# Patient Record
Sex: MALE | Race: BLACK OR AFRICAN AMERICAN | Employment: FULL TIME | URBAN - METROPOLITAN AREA
[De-identification: names, ages, dates, MRNs, and addresses within clinical notes are randomized per-mention and may not be internally consistent; named-entity substitution may affect disease eponyms.]

---

## 2018-02-05 ENCOUNTER — OFFICE VISIT (OUTPATIENT)
Dept: SURGERY | Age: 28
End: 2018-02-05

## 2018-02-05 VITALS
RESPIRATION RATE: 20 BRPM | DIASTOLIC BLOOD PRESSURE: 110 MMHG | WEIGHT: 315 LBS | TEMPERATURE: 98.8 F | HEART RATE: 83 BPM | SYSTOLIC BLOOD PRESSURE: 150 MMHG | OXYGEN SATURATION: 98 % | HEIGHT: 75 IN | BODY MASS INDEX: 39.17 KG/M2

## 2018-02-05 DIAGNOSIS — R29.818 SUSPECTED SLEEP APNEA: ICD-10-CM

## 2018-02-05 DIAGNOSIS — E66.01 MORBID OBESITY WITH BMI OF 45.0-49.9, ADULT (HCC): Primary | ICD-10-CM

## 2018-02-05 DIAGNOSIS — K21.9 GASTROESOPHAGEAL REFLUX DISEASE, ESOPHAGITIS PRESENCE NOT SPECIFIED: ICD-10-CM

## 2018-02-05 RX ORDER — GUANFACINE HYDROCHLORIDE 1 MG/1
TABLET ORAL DAILY
COMMUNITY

## 2018-02-05 RX ORDER — CLONAZEPAM 2 MG/1
TABLET ORAL 2 TIMES DAILY
COMMUNITY

## 2018-02-05 RX ORDER — RANITIDINE 300 MG/1
300 TABLET ORAL DAILY
COMMUNITY

## 2018-02-05 RX ORDER — MINOCYCLINE HYDROCHLORIDE 100 MG/1
100 TABLET ORAL 2 TIMES DAILY
COMMUNITY

## 2018-02-05 NOTE — PROGRESS NOTES
Bariatric Surgery Consult    Aleksandr Bergeron is a 32 y.o. male with a history of morbid obesity. His Height: 6' 3\" (190.5 cm), Weight: (!) 371 lb 6.4 oz (168.5 kg). Body mass index is 46.42 kg/(m^2). He reports that he has been trying to lose weight for \"a few\" years, with 12 lbs being the most he lost. His maximum weight was 380 pounds. He has attended our online bariatric surgery information seminar. Dimas De La Rosa wants to consider laparoscopic gastric bypass surgery. Pt is self-referred. Dietary History:   The patient says that in the past, unsupervised diets have not resulted in real success. When asked why he was not able to achieve or maintain significant weight loss he replied, \"I injured my back while exercising in the past, which prevents me from being physically active for prolonged periods. \"     Number of meals per day: 1-2 meals -- no breakfast, big lunch, and supper  Portion size: moderate to large  Snacks: 2-3x per day while at work (e.g. Chips, fruit, veggies)  Other dietary indiscretions:   Lenell Brian food -- rarely d/t causing nausea    Fast food -- 2-3x per week (e.g. Salad or grilled wrap)    Soda -- 1-1.5 L per day  (e.g. Ginger ale)   Sweets -- rarely    Carbohydrates -- \"Mostly bread. These are my downfall,\" per patient. Comorbidities:     Bariatric comorbidities present: GERD and weight related arthopathies    GERD is severe -- taking 300 mg Zantac OTC daily. EGD performed ~18 months ago in [place], [place], which was normal, per patient. Acid reflux has worsened since then. STOPBANG questionnaire    Recent sleep studies: 1.5 (home) and 2 years ago (in clinic). No diagnosis of HOSEA, but patient states that he \"always has difficulty falling asleep. \"        Do you Snore loudly? YES  Do you often feel Tired, fatigued, or sleepy during the daytime? YES  Has anyone Observed you stop breathing during your sleep? YES  Are you being treated for high blood Pressure? NO  BMI more than 35 kg/m2? YES  Age over 48years old? NO  Neck Circumference >16 inches? YES  Gender male? YES  ______________________________________    SCORE: 6/8    If YES to 0  2, low risk of sleep apnea  If YES to 3  4  intermediate risk of having sleep apnea  If YES to 5  8  high risk of having sleep apnea (or 2 + BMI 35 or Neck > 17\" or Male)     Ambulatory status: independent. Interrupted by back pain 2x/year. The patient's reported level of exercise: moderately active. Walking 1/2 mile per day at work. Past Medical History:   Diagnosis Date    GERD (gastroesophageal reflux disease)     Headache       Past Surgical History:   Procedure Laterality Date    HX ADENOIDECTOMY      as a child    HX OTHER SURGICAL  2010    cyst removed from back of head    HX TONSILLECTOMY      as a child. Current Outpatient Prescriptions   Medication Sig    clonazePAM (KLONOPIN) 2 mg tablet Take  by mouth two (2) times a day.  raNITIdine (ZANTAC) 300 mg tablet Take 300 mg by mouth daily.  guanFACINE IR (TENEX) 1 mg IR tablet Take  by mouth daily.  minocycline (DYNACIN) 100 mg tablet Take 100 mg by mouth two (2) times a day. No current facility-administered medications for this visit.        Not on File  Social History   Substance Use Topics    Smoking status: Former Smoker     Quit date: 2/5/2016    Smokeless tobacco: Current User    Alcohol use Yes      Family History   Problem Relation Age of Onset   [address] [facility] Cancer Mother     Depression Mother     Heart Attack Father             Review of Systems:  A comprehensive review of 12 systems was negative except for:   Gastrointestinal: acid indigestion or heartburn   Musculoskeletal: back trouble   Neurological: frequent headaches       Objective:     Visit Vitals    BP (!) 150/110    Pulse 83    Temp 98.8 °F (37.1 °C) (Oral)    Resp 20    Ht 6' 3\" (1.905 m)    Wt (!) 371 lb 6.4 oz (168.5 kg)    SpO2 98%    BMI 46.42 kg/m2        Physical Exam:    General:  alert, cooperative, NAD   Eyes:  conjunctivae and sclerae normal   Throat & Neck: no erythema or exudates noted and neck supple and symmetrical; no palpable masses  Mallamapatti class 4    Lungs:   clear to auscultation bilaterally   Heart:  Regular rate and rhythm   Abdomen:   abdomen is soft and obese without significant tenderness, masses, organomegaly or guarding, normal bowel sounds   Extremities: extremities normal, atraumatic, no edema   Skin: Normal.   Neuro: Mental status: Alert, oriented, thought content appropriate  Gait: Normal   Lymphatic: No supraclavicular adenopathy          Assessment:     Diagnoses and all orders for this visit:    1. Morbid obesity with BMI of 45.0-49.9, adult ([facility] [address]    2. Gastroesophageal reflux disease, esophagitis presence not specified    3. Suspected sleep apnea      Morbid obesity (Body mass index is 46.42 kg/(m^2). ) with multiple co-morbidities including GERD and weight related arthopathies. The patient meets criteria established by the NIH for weight loss surgery candidates. Without weight reduction, co-morbidities will escalate as well as increase risk of early mortality. Our recommendation is the patient could be served with laparoscopic gastric bypass surgery, due to severe GERD and reflux symptoms which are not well-controlled by daily 300mg Zantac. I explained to the patient differences between laparoscopic gastric bypass and laparoscopic vertical sleeve gastrectomy with respect to expected weight loss, resolution of comorbidities and risks. Mr. Tamika Damon has attended one our informational meetings and has seen our educational materials. He has requested Dr. Sharolyn Mortimer to perform his procedure. I reviewed the role for this procedure as a tool to help him achieve his weight loss goals. I reminded him that effective weight loss comes from lifelong adherence to changes in dietary choices, eating habits and exercise.     While patient has been tested for HOSEA in the past with no diagnosis, STOP BANG score of 6/8 and Mallamapatti class 4 indicates otherwise. Advised the patient to provide office with documentation of prior sleep studies during next visit. If results are ambiguous or inconclusive, we may refer the patient to sleep medicine for re-testing. Recommendation:     1. We recommend that the patient undergo the following evaluations prior to considering requesting approval for laparoscopic gastric bypass surgery:       Dietician: YES, referral provided. Gastroenterology: YES, referral provided for patient to undergo UGI. Psychiatry/Psychology: YES, referral NOT provided to patient. Sleep Medicine: No, STOP BANG score 6/8.     2. Patient was reminded to begin being mindful of food choices, exercising portion control, and to engage in regular physical activity. 3. Provide office with prior sleep study results. 1:31 PM - 1:53 PM  Total face to face time with patient: 22 minutes. Greater than 50% of the time was spent in counseling.      Signed By: Priyanka Greco MD     February 5, 2018        Written by Rashawn Denton, as dictated by Orlando Mckeon MD.

## 2018-02-05 NOTE — PROGRESS NOTES
1. Have you been to the ER, urgent care clinic since your last visit? Hospitalized since your last visit? No    2. Have you seen or consulted any other health care providers outside of the [facility] since your last visit? Include any pap smears or colon screening. No       Ramses Mccall [place] composition    male  32 y.o. Vitals:    02/05/18 1309   Weight: (!) 371 lb 6.4 oz (168.5 kg)   Height: 6' 3\" (1.905 m)     Body mass index is 46.42 kg/(m^2). [place] Neck- 18.5 inches  Waist-61.5 inches  Hips-*54.5 inchesFrame size-7 medium

## 2018-02-05 NOTE — Clinical Note
I have asked him to provide us with copy of his last sleep eval. He claims the tests say no but his STOPBANG score says otherwise.

## 2018-02-12 DIAGNOSIS — K21.9 GASTROESOPHAGEAL REFLUX DISEASE, ESOPHAGITIS PRESENCE NOT SPECIFIED: Primary | ICD-10-CM

## 2018-02-12 DIAGNOSIS — E66.01 MORBID OBESITY WITH BMI OF 45.0-49.9, ADULT (HCC): ICD-10-CM

## 2018-02-27 ENCOUNTER — HOSPITAL ENCOUNTER (OUTPATIENT)
Dept: GENERAL RADIOLOGY | Age: 28
Discharge: HOME | End: 2018-02-27
Attending: SURGERY
Payer: COMMERCIAL

## 2018-02-27 DIAGNOSIS — K21.9 GASTROESOPHAGEAL REFLUX DISEASE, ESOPHAGITIS PRESENCE NOT SPECIFIED: ICD-10-CM

## 2018-02-27 DIAGNOSIS — E66.01 MORBID OBESITY WITH BMI OF 45.0-49.9, ADULT (HCC): ICD-10-CM

## 2018-02-27 PROCEDURE — 74241 XR UPPER GI SERIES W KUB: CPT

## 2018-03-13 ENCOUNTER — HOSPITAL ENCOUNTER (OUTPATIENT)
Dept: MRI IMAGING | Age: 28
Discharge: HOME | End: 2018-03-13
Attending: OPHTHALMOLOGY
Payer: COMMERCIAL

## 2018-03-13 VITALS — WEIGHT: 315 LBS | BODY MASS INDEX: 46.25 KG/M2

## 2018-03-13 DIAGNOSIS — H47.093 OTHER DISORDERS OF OPTIC NERVE, NOT ELSEWHERE CLASSIFIED, BILATERAL: ICD-10-CM

## 2018-03-13 PROCEDURE — A9576 INJ PROHANCE MULTIPACK: HCPCS | Performed by: OPHTHALMOLOGY

## 2018-03-13 PROCEDURE — 74011250636 HC RX REV CODE- 250/636: Performed by: OPHTHALMOLOGY

## 2018-03-13 PROCEDURE — 70553 MRI BRAIN STEM W/O & W/DYE: CPT

## 2018-03-13 PROCEDURE — 70544 MR ANGIOGRAPHY HEAD W/O DYE: CPT

## 2018-03-13 RX ADMIN — GADOTERIDOL 20 ML: 279.3 INJECTION, SOLUTION INTRAVENOUS at 22:00

## 2018-03-21 ENCOUNTER — OFFICE VISIT (OUTPATIENT)
Dept: NEUROLOGY | Age: 28
End: 2018-03-21

## 2018-03-21 ENCOUNTER — CLINICAL SUPPORT (OUTPATIENT)
Dept: SURGERY | Age: 28
End: 2018-03-21

## 2018-03-21 VITALS
HEART RATE: 80 BPM | RESPIRATION RATE: 18 BRPM | OXYGEN SATURATION: 98 % | SYSTOLIC BLOOD PRESSURE: 156 MMHG | DIASTOLIC BLOOD PRESSURE: 90 MMHG | BODY MASS INDEX: 47.75 KG/M2 | WEIGHT: 315 LBS

## 2018-03-21 VITALS — WEIGHT: 315 LBS | BODY MASS INDEX: 47.75 KG/M2

## 2018-03-21 DIAGNOSIS — G43.709 CHRONIC MIGRAINE W/O AURA W/O STATUS MIGRAINOSUS, NOT INTRACTABLE: ICD-10-CM

## 2018-03-21 DIAGNOSIS — H47.10 OPTIC DISC EDEMA: ICD-10-CM

## 2018-03-21 DIAGNOSIS — H47.10 OPTIC DISC EDEMA: Primary | ICD-10-CM

## 2018-03-21 DIAGNOSIS — E66.01 MORBID OBESITY WITH BMI OF 45.0-49.9, ADULT (HCC): Primary | ICD-10-CM

## 2018-03-21 RX ORDER — CEPHALEXIN 500 MG/1
500 CAPSULE ORAL 4 TIMES DAILY
COMMUNITY

## 2018-03-21 RX ORDER — CLONIDINE HYDROCHLORIDE 0.1 MG/1
TABLET ORAL 2 TIMES DAILY
COMMUNITY

## 2018-03-21 RX ORDER — PROPRANOLOL HYDROCHLORIDE 60 MG/1
60 TABLET ORAL 2 TIMES DAILY
COMMUNITY

## 2018-03-21 NOTE — PATIENT INSTRUCTIONS
A Healthy Lifestyle: Care Instructions  Your Care Instructions    A healthy lifestyle can help you feel good, stay at a healthy weight, and have plenty of energy for both work and play. A healthy lifestyle is something you can share with your whole family. A healthy lifestyle also can lower your risk for serious health problems, such as high blood pressure, heart disease, and diabetes. You can follow a few steps listed below to improve your health and the health of your family. Follow-up care is a key part of your treatment and safety. Be sure to make and go to all appointments, and call your doctor if you are having problems. It's also a good idea to know your test results and keep a list of the medicines you take. How can you care for yourself at home? · Do not eat too much sugar, fat, or fast foods. You can still have dessert and treats now and then. The goal is moderation. · Start small to improve your eating habits. Pay attention to portion sizes, drink less juice and soda pop, and eat more fruits and vegetables. ¨ Eat a healthy amount of food. A 3-ounce serving of meat, for example, is about the size of a deck of cards. Fill the rest of your plate with vegetables and whole grains. ¨ Limit the amount of soda and sports drinks you have every day. Drink more water when you are thirsty. ¨ Eat at least 5 servings of fruits and vegetables every day. It may seem like a lot, but it is not hard to reach this goal. A serving or helping is 1 piece of fruit, 1 cup of vegetables, or 2 cups of leafy, raw vegetables. Have an apple or some carrot sticks as an afternoon snack instead of a candy bar. Try to have fruits and/or vegetables at every meal.  · Make exercise part of your daily routine. You may want to start with simple activities, such as walking, bicycling, or slow swimming. Try to be active 30 to 60 minutes every day. You do not need to do all 30 to 60 minutes all at once.  For example, you can exercise 3 times a day for 10 or 20 minutes. Moderate exercise is safe for most people, but it is always a good idea to talk to your doctor before starting an exercise program.  · Keep moving. Cheryle Carsonpin the lawn, work in the garden, or hybris. Take the stairs instead of the elevator at work. · If you smoke, quit. People who smoke have an increased risk for heart attack, stroke, cancer, and other lung illnesses. Quitting is hard, but there are ways to boost your chance of quitting tobacco for good. ¨ Use nicotine gum, patches, or lozenges. ¨ Ask your doctor about stop-smoking programs and medicines. ¨ Keep trying. In addition to reducing your risk of diseases in the future, you will notice some benefits soon after you stop using tobacco. If you have shortness of breath or asthma symptoms, they will likely get better within a few weeks after you quit. · Limit how much alcohol you drink. Moderate amounts of alcohol (up to 2 drinks a day for men, 1 drink a day for women) are okay. But drinking too much can lead to liver problems, high blood pressure, and other health problems. Family health  If you have a family, there are many things you can do together to improve your health. · Eat meals together as a family as often as possible. · Eat healthy foods. This includes fruits, vegetables, lean meats and dairy, and whole grains. · Include your family in your fitness plan. Most people think of activities such as jogging or tennis as the way to fitness, but there are many ways you and your family can be more active. Anything that makes you breathe hard and gets your heart pumping is exercise. Here are some tips:  ¨ Walk to do errands or to take your child to school or the bus. ¨ Go for a family bike ride after dinner instead of watching TV. Where can you learn more? Go to http://diandra-soco.info/. Enter X617 in the search box to learn more about \"A Healthy Lifestyle: Care Instructions. \"  Current as of: May 12, 2017  Content Version: 11.4  © 4363-8713 [employer]. Care instructions adapted under license by [employer] (which disclaims liability or warranty for this information). If you have questions about a medical condition or this instruction, always ask your healthcare professional. [address] any warranty or liability for your use of this information. Lumbar Puncture: After Your Visit  Your Care Instructions  A lumbar puncture (also called a spinal tap) is a test to check the fluid that surrounds and protects your spinal cord and brain. Your doctor may have done this test to look for an infection. In some cases, a lumbar puncture is done to release pressure from too much fluid or to look for diseases such as multiple sclerosis. The fluid that was taken is often sent to a lab for different tests. Your doctor may get some answers right away, but other answers take hours to days. Your doctor will call you with the results. You may feel tired or have a mild backache or a headache after the test. Some people have trouble sleeping for 1 or 2 days. Follow-up care is a key part of your treatment and safety. Be sure to make and go to all appointments, and call your doctor if you are having problems. Its also a good idea to know your test results and keep a list of the medicines you take. How can you care for yourself at home? · Drink plenty of liquids in the next few hours. This may prevent a headache or keep a headache from being severe. · Your doctor may tell you to lie flat in bed for 1 to 4 hours. This may prevent a headache. · Get plenty of rest.  · If your doctor prescribed antibiotics, take them as directed. Do not stop taking them just because you feel better. You need to take the full course of antibiotics. · Take anti-inflammatory medicines to reduce a headache or backache. These include ibuprofen (Advil, Motrin) and naproxen (Aleve).  Read and follow all instructions on the label. When should you call for help? Call your doctor now or seek immediate medical care if:  · You have a fever with a stiff neck or a severe headache. · You have any drainage or bleeding from the site of the puncture. · You feel numb or lose strength below the puncture site. Watch closely for changes in your health, and be sure to contact your doctor if:  · You do not get better as expected. Where can you learn more? Go to [employer].be  Enter B775 in the search box to learn more about \"Lumbar Puncture: After Your Visit. \"   © 3971-6050 [employer]. Care instructions adapted under license by [employer] (which disclaims liability or warranty for this information). This care instruction is for use with your licensed healthcare professional. If you have questions about a medical condition or this instruction, always ask your healthcare professional. [address] any warranty or liability for your use of this information.   Content Version: 0.2.030067; Last Revised: September 13, 2011

## 2018-03-21 NOTE — PROGRESS NOTES
Chief Complaint   Patient presents with    Headache     Abnormal eye exam       Referred by: Dr. Cortney Blue      HPI    Mr. Graciela Hopson is a 24-year-old gentleman who works for an insurance company here for abnormal eye findings. He tells me he went for a routine optometry evaluation and was found to have papilledema. He was ultimately seen by Dr. Ellen Brink at ophthalmology and found to have bilateral optic disc edema with an abnormal OCT. He in general complains of a history of long-term migraines ever since he was a teenager which is controlled with propranolol. He does not have severe pain currently. No pulsatile tinnitus. He does complain of blurry vision. MRI and MRV were done and both benign. He is currently being considered for gastric bypass. Not complaining any of the usual numbness or weakness or speech change. He does get dizziness at times. Review of Systems   Eyes: Positive for blurred vision. Neurological: Positive for dizziness. All other systems reviewed and are negative. Past Medical History:   Diagnosis Date    GERD (gastroesophageal reflux disease)     Headache      Family History   Problem Relation Age of Onset    Cancer Mother     Depression Mother     Heart Attack Father      Social History     Social History    Marital status: SINGLE     Spouse name: N/A    Number of children: N/A    Years of education: N/A     Occupational History    Not on file. Social History Main Topics    Smoking status: Former Smoker     Quit date: 2/5/2016    Smokeless tobacco: Current User    Alcohol use Yes    Drug use: No    Sexual activity: Not on file     Other Topics Concern    Not on file     Social History Narrative     Current Outpatient Prescriptions   Medication Sig    cloNIDine HCl (CATAPRES) 0.1 mg tablet Take  by mouth two (2) times a day.  propranolol (INDERAL) 60 mg tablet Take 60 mg by mouth two (2) times a day.     cephALEXin (KEFLEX) 500 mg capsule Take 500 mg by mouth four (4) times daily.  raNITIdine (ZANTAC) 300 mg tablet Take 300 mg by mouth daily.  clonazePAM (KLONOPIN) 2 mg tablet Take  by mouth two (2) times a day.  guanFACINE IR (TENEX) 1 mg IR tablet Take  by mouth daily.  minocycline (DYNACIN) 100 mg tablet Take 100 mg by mouth two (2) times a day. No current facility-administered medications for this visit. No Known Allergies      Neurologic Exam     Mental Status   Oriented to person, place, and time. Cranial Nerves   Cranial nerves II through XII intact. Bilateral optic disc edema     Motor Exam   Muscle bulk: normal    Strength   Strength 5/5 throughout. Sensory Exam   Light touch normal.     Gait, Coordination, and Reflexes     Gait  Gait: normal (Wide secondary body habitus)    Tremor   Resting tremor: absent    Physical Exam   Constitutional: He is oriented to person, place, and time. He appears well-developed and well-nourished. Cardiovascular: Normal rate. Pulmonary/Chest: Effort normal.   Neurological: He is oriented to person, place, and time. He has normal strength. Gait normal.   Skin: Skin is warm and dry. Psychiatric: He has a normal mood and affect. His behavior is normal.   Vitals reviewed.     Visit Vitals    /90    Pulse 80    Resp 18    Wt (!) 173.3 kg (382 lb)    SpO2 98%    BMI 47.75 kg/m2       No results found for: WBC, WBCT, WBCPOC, HGB, HGBPOC, HCT, HCTPOC, PLT, PLTPOC, MCV, MCVPOC, HGBEXT, HCTEXT, PLTEXT  No results found for: HBA1C, ZWA4LMRB, HGBE8, GLU, GESTF, GLUCPOC, MCACR, MCA1, MCA2, MCA3, MCAU, LDL, LDLC, DLDLP, MAHNAZ, CREAPOC, ACREA, CREA, REFC3, REFC4, [id number]   No results found for: CHOL, CHOLPOCT, HDL, LDL, LDLC, LDLCPOC, LDLCEXT, TRIGL, TGLPOCT, CHHD, CHHDX  No results found for: GPT, ALTPOC, ALT, SGOT, ASTPOC, GGT, AP, APIT, APX, CBIL, TBIL, TBILI, ALB, ALBPOC, TP, NH3, NH4, INR, PTP, PTINR, PTEXT, PLT, PLTPOC, HCABQL, HBSAG, AFP, PTEXT, PLTEXT       CT Results (maximum last 3): No results found for this or any previous visit. MRI Results (maximum last 3): Results from [place] encounter on 03/[place]/18   MRV BRAIN WO CONT   Narrative INDICATION:  Other disorders of optic nerve, not elsewhere classified, bilateral      COMPARISON:  None    TECHNIQUE:  MR imaging of the brain was performed with particular attention to  the orbits including sagittal T1, axial T1, T2, FLAIR, GRE, DWI/ADC; coronal T2  with fat saturation;  multiplanar pre and post T1of the whole brain and orbits  with and without fat saturation utilizing 20 mL gadolinium. Multiplanar 2-D  time-of-flight MRV of the brain was performed with reconstructions. FINDINGS:      The ventricles are midline without hydrocephalus. There is no acute intra or  extra-axial fluid collection. There is no significant white matter disease. There is no acute infarction. The major intracranial vascular flow-voids are  patent. The optic nerves, extraocular muscles, and intraorbital fat are normal.   There is no abnormal intraorbital enhancement. The optic chiasm is normal.   There is no suprasellar mass. Pituitary gland is normal in volume without  evidence of partial into sella turcica. There is no evidence of superficial or deep venous sinus thrombosis. There is  mild, symmetric narrowing of the bilateral distal transverse sinuses. Impression IMPRESSION:  1. No significant intraorbital abnormality. No optic nerve signal abnormality  or enhancement. No suprasellar mass. No partial empty sella turcica. 2. No evidence of venous sinus thrombosis, though there are mild symmetric  stenoses in the distal transverse sinuses.       MRI BRAIN W WO CONT   Narrative INDICATION:  Other disorders of optic nerve, not elsewhere classified, bilateral      COMPARISON:  None    TECHNIQUE:  MR imaging of the brain was performed with particular attention to  the orbits including sagittal T1, axial T1, T2, FLAIR, GRE, DWI/ADC; coronal T2  with fat saturation;  multiplanar pre and post T1of the whole brain and orbits  with and without fat saturation utilizing 20 mL gadolinium. Multiplanar 2-D  time-of-flight MRV of the brain was performed with reconstructions. FINDINGS:      The ventricles are midline without hydrocephalus. There is no acute intra or  extra-axial fluid collection. There is no significant white matter disease. There is no acute infarction. The major intracranial vascular flow-voids are  patent. The optic nerves, extraocular muscles, and intraorbital fat are normal.   There is no abnormal intraorbital enhancement. The optic chiasm is normal.   There is no suprasellar mass. Pituitary gland is normal in volume without  evidence of partial into sella turcica. There is no evidence of superficial or deep venous sinus thrombosis. There is  mild, symmetric narrowing of the bilateral distal transverse sinuses. Impression IMPRESSION:  1. No significant intraorbital abnormality. No optic nerve signal abnormality  or enhancement. No suprasellar mass. No partial empty sella turcica. 2. No evidence of venous sinus thrombosis, though there are mild symmetric  stenoses in the distal transverse sinuses. PET Results (maximum last 3): No results found for this or any previous visit. Assessment and Plan   Diagnoses and all orders for this visit:    1. Optic disc edema  -     XR SPINAL PUNC LUMB DX; Future  -     CELL COUNT, CSF; Future  -     PROTEIN, CSF; Future  -     GLUCOSE, CSF  -     CULTURE, BODY FLUID W GRAM STAIN    2. Chronic migraine w/o aura w/o status migrainosus, not intractable      15-year-old gentleman who has bilateral optic disc edema. Need to obtain a lumbar puncture with opening pressure to confirm if this is increased intracranial hypertension. Depending on those results we may start acetazolamide. He is not overtly symptomatic aside from his ophthalmologic Findings.  I would like to see him after the spinal tap is done. We had a long discussion about the possible diagnosis and the complications if it were remain untreated. I will see him after the procedure is done. I reviewed and decided to continue the current medications. A notice of this visit/encounter being completed has been sent electronically to the patient's PCP and/or referring provider.      Albaro Lopes, [address]  Diplomate ABPN

## 2018-03-21 NOTE — COMMUNICATION BODY
Chief Complaint   Patient presents with    Headache     Abnormal eye exam       Referred by: Dr. Lin Diallo      HPI    Mr. 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 He tells me he went for a routine optometry evaluation and was found to have papilledema. He was ultimately seen by Dr. Phoebe Pelletier at ophthalmology and found to have bilateral optic disc edema with an abnormal OCT. He in general complains of a history of long-term migraines ever since he was a teenager which is controlled with propranolol. He does not have severe pain currently. No pulsatile tinnitus. He does complain of blurry vision. MRI and MRV were done and both benign. He is currently being considered for gastric bypass. Not complaining any of the usual numbness or weakness or speech change. He does get dizziness at times. Review of Systems   Eyes: Positive for blurred vision. Neurological: Positive for dizziness. All other systems reviewed and are negative. Past Medical History:   Diagnosis Date    GERD (gastroesophageal reflux disease)     Headache      Family History   Problem Relation Age of Onset    Cancer Mother     Depression Mother     Heart Attack Father      Social History     Social History    Marital status: SINGLE     Spouse name: N/A    Number of children: N/A    Years of education: N/A     Occupational History    Not on file. Social History Main Topics    Smoking status: Former Smoker     Quit date: 2/5/2016    Smokeless tobacco: Current User    Alcohol use Yes    Drug use: No    Sexual activity: Not on file     Other Topics Concern    Not on file     Social History Narrative     Current Outpatient Prescriptions   Medication Sig    cloNIDine HCl (CATAPRES) 0.1 mg tablet Take  by mouth two (2) times a day.  propranolol (INDERAL) 60 mg tablet Take 60 mg by mouth two (2) times a day.     cephALEXin (KEFLEX) 500 mg capsule Take 500 mg by mouth four (4) times daily.  raNITIdine (ZANTAC) 300 mg tablet Take 300 mg by mouth daily.  clonazePAM (KLONOPIN) 2 mg tablet Take  by mouth two (2) times a day.  guanFACINE IR (TENEX) 1 mg IR tablet Take  by mouth daily.  minocycline (DYNACIN) 100 mg tablet Take 100 mg by mouth two (2) times a day. No current facility-administered medications for this visit. No Known Allergies      Neurologic Exam     Mental Status   Oriented to person, place, and time. Cranial Nerves   Cranial nerves II through XII intact. Bilateral optic disc edema     Motor Exam   Muscle bulk: normal    Strength   Strength 5/5 throughout. Sensory Exam   Light touch normal.     Gait, Coordination, and Reflexes     Gait  Gait: normal (Wide secondary body habitus)    Tremor   Resting tremor: absent    Physical Exam   Constitutional: He is oriented to person, place, and time. He appears well-developed and well-nourished. Cardiovascular: Normal rate. Pulmonary/Chest: Effort normal.   Neurological: He is oriented to person, place, and time. He has normal strength. Gait normal.   Skin: Skin is warm and dry. Psychiatric: He has a normal mood and affect. His behavior is normal.   Vitals reviewed.     Visit Vitals    /90    Pulse 80    Resp 18    Wt (!) 173.3 kg (382 lb)    SpO2 98%    BMI 47.75 kg/m2       No results found for: WBC, WBCT, WBCPOC, HGB, HGBPOC, HCT, HCTPOC, PLT, PLTPOC, MCV, MCVPOC, HGBEXT, HCTEXT, PLTEXT  No results found for: HBA1C, IHR6WIEG, HGBE8, GLU, GESTF, GLUCPOC, MCACR, MCA1, MCA2, MCA3, MCAU, LDL, LDLC, DLDLP, MAHNAZ, CREAPOC, ACREA, CREA, REFC3, REFC4, CCH9UCEA   No results found for: CHOL, CHOLPOCT, HDL, LDL, LDLC, LDLCPOC, LDLCEXT, TRIGL, TGLPOCT, CHHD, CHHDX  No results found for: GPT, ALTPOC, ALT, SGOT, ASTPOC, GGT, AP, APIT, APX, CBIL, TBIL, TBILI, ALB, ALBPOC, TP, NH3, NH4, INR, PTP, PTINR, PTEXT, PLT, PLTPOC, HCABQL, HBSAG, AFP, PTEXT, PLTEXT       CT Results (maximum last 3): No results found for this or any previous visit. MRI Results (maximum last 3): Results from [place] encounter on 03/13/18   MRV BRAIN WO CONT   Narrative INDICATION:  Other disorders of optic nerve, not elsewhere classified, bilateral      COMPARISON:  None    TECHNIQUE:  MR imaging of the brain was performed with particular attention to  the orbits including sagittal T1, axial T1, T2, FLAIR, GRE, DWI/ADC; coronal T2  with fat saturation;  multiplanar pre and post T1of the whole brain and orbits  with and without fat saturation utilizing 20 mL gadolinium. Multiplanar 2-D  time-of-flight MRV of the brain was performed with reconstructions. FINDINGS:      The ventricles are midline without hydrocephalus. There is no acute intra or  extra-axial fluid collection. There is no significant white matter disease. There is no acute infarction. The major intracranial vascular flow-voids are  patent. The optic nerves, extraocular muscles, and intraorbital fat are normal.   There is no abnormal intraorbital enhancement. The optic chiasm is normal.   There is no suprasellar mass. Pituitary gland is normal in volume without  evidence of partial into sella turcica. There is no evidence of superficial or deep venous sinus thrombosis. There is  mild, symmetric narrowing of the bilateral distal transverse sinuses. Impression IMPRESSION:  1. No significant intraorbital abnormality. No optic nerve signal abnormality  or enhancement. No suprasellar mass. No partial empty sella turcica. 2. No evidence of venous sinus thrombosis, though there are mild symmetric  stenoses in the distal transverse sinuses.       MRI BRAIN W WO CONT   Narrative INDICATION:  Other disorders of optic nerve, not elsewhere classified, bilateral      COMPARISON:  None    TECHNIQUE:  MR imaging of the brain was performed with particular attention to  the orbits including sagittal T1, axial T1, T2, FLAIR, GRE, DWI/ADC; coronal T2  with fat saturation;  multiplanar pre and post T1of the whole brain and orbits  with and without fat saturation utilizing 20 mL gadolinium. Multiplanar 2-D  time-of-flight MRV of the brain was performed with reconstructions. FINDINGS:      The ventricles are midline without hydrocephalus. There is no acute intra or  extra-axial fluid collection. There is no significant white matter disease. There is no acute infarction. The major intracranial vascular flow-voids are  patent. The optic nerves, extraocular muscles, and intraorbital fat are normal.   There is no abnormal intraorbital enhancement. The optic chiasm is normal.   There is no suprasellar mass. Pituitary gland is normal in volume without  evidence of partial into sella turcica. There is no evidence of superficial or deep venous sinus thrombosis. There is  mild, symmetric narrowing of the bilateral distal transverse sinuses. Impression IMPRESSION:  1. No significant intraorbital abnormality. No optic nerve signal abnormality  or enhancement. No suprasellar mass. No partial empty sella turcica. 2. No evidence of venous sinus thrombosis, though there are mild symmetric  stenoses in the distal transverse sinuses. PET Results (maximum last 3): No results found for this or any previous visit. Assessment and Plan   Diagnoses and all orders for this visit:    1. Optic disc edema  -     XR SPINAL PUNC LUMB DX; Future  -     CELL COUNT, CSF; Future  -     PROTEIN, CSF; Future  -     GLUCOSE, CSF  -     CULTURE, BODY FLUID W GRAM STAIN    2. Chronic migraine w/o aura w/o status migrainosus, not intractable      64-year-old gentleman who has bilateral optic disc edema. Need to obtain a lumbar puncture with opening pressure to confirm if this is increased intracranial hypertension. Depending on those results we may start acetazolamide. He is not overtly symptomatic aside from his ophthalmologic Findings.  I would like to see him after the spinal tap is done. We had a long discussion about the possible diagnosis and the complications if it were remain untreated. I will see him after the procedure is done. I reviewed and decided to continue the current medications. A notice of this visit/encounter being completed has been sent electronically to the patient's PCP and/or referring provider.      [address] [place] [address], [facility] [address]  Diplomate AMADEON

## 2018-03-21 NOTE — PROGRESS NOTES
Pre-operative Bariatric Nutrition Evaluation    Date: 3/21/2018   Dora Porter M.D. Name: Daniela Orr  :  1990  Age:  32  Gender: Male   Type of Surgery: [x]           Gastric Bypass  []           LAGB  []           Sleeve Gastrectomy    ASSESSMENT:    Past Medical History:GERD, chronic migraines, degenerative disc disease      Medications/Supplements:   Prior to Admission medications    Medication Sig Start Date End Date Taking? Authorizing Provider   cloNIDine HCl (CATAPRES) 0.1 mg tablet Take  by mouth two (2) times a day. Historical Provider   propranolol (INDERAL) 60 mg tablet Take 60 mg by mouth two (2) times a day. Historical Provider   cephALEXin (KEFLEX) 500 mg capsule Take 500 mg by mouth four (4) times daily. Historical Provider   clonazePAM (KLONOPIN) 2 mg tablet Take  by mouth two (2) times a day. Historical Provider   raNITIdine (ZANTAC) 300 mg tablet Take 300 mg by mouth daily. Historical Provider   guanFACINE IR (TENEX) 1 mg IR tablet Take  by mouth daily. Historical Provider   minocycline (DYNACIN) 100 mg tablet Take 100 mg by mouth two (2) times a day. Historical Provider       Food Allergies/Intolerances:none    Anthropometrics:    Ht:6' 3\"    Wt: 382#    IBW: 196#     %IBW: 194%     BMI:47     Category: obesity III     Reported wt history:Pt presents today for pre-op nutrition evaluation for wt loss surgery. Pt states he has been overweight most of his life and reports a strong family h/o overweight and obesity. Lowest adult BW was 265# at age 27 and current wt is highest adult BW. Attributes wt gain over the years r/t work schedule that makes it difficult to be consistent with healthy eating habits. Pt admits to heavy reliance on convenience foods and minimal cooking and food prepping at home. Will go through periods of meal prepping at home and then time becomes an issue and he falls of track with these habits.  Has attempted wt loss through diet pills, exercise and average 10# wt loss. Is now seeking approval for weight loss surgery and will complete 6 months supervised weight loss with our program.     Exercise/Physical Activity:walking but is inconsistent; was previously working with a  but is now on [employer]" d/t back pain    Reported Diet History:diet pills, exercise     24 Hour Diet Recall  Breakfast  Chick-lalo-a sandwich; Skips on weekends    Lunch  [employer] foot long or cafe at work   [employer] [facility], fruit, sandwich    Beverages  Ginger ale, Dr. Dutton Friend, minimal water intake; states soda and carbonation \"are my weakness\"      A pre-op nutrition checklist was reviewed. Patient checked off 4 of 15 items. Environment/Psychosocial/Support:pt reports good support system including girlfriend, parents, co-workers,family and a cousin who had weight loss surgery with our program in the past few months. Pt works full time and does his own food purchasing. NUTRITION DIAGNOSIS:  1. Excessive energy intake r/t food and beverage preferences evidenced by diet recall that reveals heavy reliance on fast food/restaurant/convenience foods. NUTRITION INTERVENTION:  Pt educated on nutrition recommendations for weight loss surgery, specifically gastric bypass. Instructed on consuming 3 meals per day starting now. Use the balanced plate method to plan meals, include 3 oz of lean source of protein, 1/2 cup whole grains, unlimited non-starchy vegetables, 1/2 cup fruit and 1 serving of low fat dairy. Utilize handouts listing healthy snack and meal ideas to limit restaurant meals. After surgery measure all meals to 1/2 cup. Each meal will contain a 1/4 cup lean protein and 1/4 cup fruit, non-starchy vegetable or starch (limiting to once per day). Aim for 60 g protein per day. Sip on 48-64 oz of sugar free, calorie free, non-carbonated beverages each day. Do not use a straw.  Do not consume beverages 30 minutes before, during or 30 minutes after meals. Read all nutrition labels. Demonstrated and emphasized identifying serving size, total fat, sugar and protein content. Defined low fat as </= 3 g per serving. Discussed lean and extra lean sources of protein. Provided list of low fat cooking methods. Avoid foods with sugar listed in the first 3 ingredients and >/15 g sugar per serving. Excess sugar/fat intake may lead to dumping syndrome. Discussed signs and symptoms of dumping syndrome. Practice mindful eating habits; take small bites, chew thoroughly, avoid distractions, utilize hunger/fullness scale. Consume meals over 20-30 minutes. Attend Bariatric Support Group and increase physical activity (approved per MD) for long term weight maintenance. NUTRITION MONITORING AND EVALUATION:    The following goals were established with patient;  1. Decrease reliance on fast food/restaurant meals. Get back to grocery shopping and cooking/meal prepping from home. 2. Work towards elimination of sodas and other carbonated beverages. 3. Resume exercise as tolerated. 4. Eat 3 meals a day, even on the weekends. Okay to use a protein shake in place of skipping the meal.   5. Follow up with RD next month for supervised weight loss. Specific tips and techniques to facilitate compliance with above recommendations were provided and discussed. Pt was strongly encourage to begin making necessary changes now, attend support group, and re-visit the dietitian prn. Nutrition evaluation reveals important lifestyle changes are indicated to be considered ready for weight loss surgery. Goals set and recommendations made. Will continue to assess as pt works to complete supervised weight loss requirements. If further details are desired please feel free to contact me at [phone]. This phone number was also provided to the patient for any further questions or concerns.            Jocelyn Prajapati RD

## 2018-03-21 NOTE — LETTER
3/21/2018 Patient:  Aleksandr Bergeron YOB: 1990 Date of Visit: 3/21/2018 Dear Mellisa Love MD 
[address] [address] VIA Facsimile: [phone] 
 : I was requested by Tawanna Rebolledo MD to evaluate Mr. Aleksandr Bergeron  for Chief Complaint Patient presents with  
 Headache Abnormal eye exam  
. I am recommending the following:  
 
Diagnoses and all orders for this visit: 
 
1. Optic disc edema 
-     XR SPINAL PUNC LUMB DX; Future -     CELL COUNT, CSF; Future 
-     PROTEIN, CSF; Future -     GLUCOSE, CSF 
-     CULTURE, BODY FLUID W GRAM STAIN 
 
2. Chronic migraine w/o aura w/o status migrainosus, not intractable 
 
 
 
---------------------------------------------------------------------------------------------------------------------- Below is my encounter: Chief Complaint Patient presents with  
 Headache Abnormal eye exam  
 
 
Referred by: Dr. Corbin Mr. 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 He tells me he went for a routine optometry evaluation and was found to have papilledema. He was ultimately seen by Dr. Suleiman Peña at ophthalmology and found to have bilateral optic disc edema with an abnormal OCT. He in general complains of a history of long-term migraines ever since he was a teenager which is controlled with propranolol. He does not have severe pain currently. No pulsatile tinnitus. He does complain of blurry vision. MRI and MRV were done and both benign. He is currently being considered for gastric bypass. Not complaining any of the usual numbness or weakness or speech change. He does get dizziness at times. Review of Systems Eyes: Positive for blurred vision. Neurological: Positive for dizziness. All other systems reviewed and are negative. Past Medical History:  
Diagnosis Date  GERD (gastroesophageal reflux disease)  Headache Family History Problem Relation Age of Onset  Cancer Mother  Depression Mother  Heart Attack Father Social History Social History  Marital status: SINGLE Spouse name: N/A  
 Number of children: N/A  
 Years of education: N/A Occupational History  Not on file. Social History Main Topics  Smoking status: Former Smoker Quit date: 2/5/2016  Smokeless tobacco: Current User  Alcohol use Yes  Drug use: No  
 Sexual activity: Not on file Other Topics Concern  Not on file Social History Narrative Current Outpatient Prescriptions Medication Sig  cloNIDine HCl (CATAPRES) 0.1 mg tablet Take  by mouth two (2) times a day.  propranolol (INDERAL) 60 mg tablet Take 60 mg by mouth two (2) times a day.  cephALEXin (KEFLEX) 500 mg capsule Take 500 mg by mouth four (4) times daily.  raNITIdine (ZANTAC) 300 mg tablet Take 300 mg by mouth daily.  clonazePAM (KLONOPIN) 2 mg tablet Take  by mouth two (2) times a day.  guanFACINE IR (TENEX) 1 mg IR tablet Take  by mouth daily.  minocycline (DYNACIN) 100 mg tablet Take 100 mg by mouth two (2) times a day. No current facility-administered medications for this visit. No Known Allergies Neurologic Exam  
 
Mental Status Oriented to person, place, and time. Cranial Nerves Cranial nerves II through XII intact. Bilateral optic disc edema Motor Exam  
Muscle bulk: normal 
 
Strength Strength 5/5 throughout. Sensory Exam  
Light touch normal.  
 
Gait, Coordination, and Reflexes Gait Gait: normal (Wide secondary body habitus) Tremor Resting tremor: absent Physical Exam  
Constitutional: He is oriented to person, place, and time. He appears well-developed and well-nourished. Cardiovascular: Normal rate.    
Pulmonary/Chest: Effort normal.  
 Neurological: He is oriented to person, place, and time. He has normal strength. Gait normal.  
Skin: Skin is warm and dry. Psychiatric: He has a normal mood and affect. His behavior is normal.  
Vitals reviewed. Visit Vitals  /90  Pulse 80  Resp 18  Wt (!) 173.3 kg (382 lb)  SpO2 98%  BMI 47.75 kg/m2 No results found for: WBC, WBCT, WBCPOC, HGB, HGBPOC, HCT, HCTPOC, PLT, PLTPOC, MCV, MCVPOC, HGBEXT, HCTEXT, PLTEXT No results found for: HBA1C, HIU2DPEA, HGBE8, GLU, GESTF, GLUCPOC, MCACR, MCA1, MCA2, MCA3, MCAU, LDL, LDLC, DLDLP, MAHNAZ, CREAPOC, ACREA, CREA, REFC3, REFC4, [id number] No results found for: CHOL, CHOLPOCT, HDL, LDL, LDLC, LDLCPOC, LDLCEXT, TRIGL, TGLPOCT, CHHD, CHHDX No results found for: GPT, ALTPOC, ALT, SGOT, ASTPOC, GGT, AP, APIT, APX, CBIL, TBIL, TBILI, ALB, ALBPOC, TP, NH3, NH4, INR, PTP, PTINR, PTEXT, PLT, PLTPOC, HCABQL, HBSAG, AFP, PTEXT, PLTEXT 
  
 
CT Results (maximum last 3): No results found for this or any previous visit. MRI Results (maximum last 3): Results from [facility] encounter on 03/13/18 MRV BRAIN WO CONT Narrative INDICATION:  Other disorders of optic nerve, not elsewhere classified, bilateral 
 
 
COMPARISON:  None TECHNIQUE:  MR imaging of the brain was performed with particular attention to 
the orbits including sagittal T1, axial T1, T2, FLAIR, GRE, DWI/ADC; coronal T2 
with fat saturation;  multiplanar pre and post T1of the whole brain and orbits 
with and without fat saturation utilizing 20 mL gadolinium. Multiplanar 2-D 
time-of-flight MRV of the brain was performed with reconstructions. FINDINGS:   
 
The ventricles are midline without hydrocephalus. There is no acute intra or 
extra-axial fluid collection. There is no significant white matter disease. There is no acute infarction. The major intracranial vascular flow-voids are 
patent.  The optic nerves, extraocular muscles, and intraorbital fat are normal.  
There is no abnormal intraorbital enhancement. The optic chiasm is normal.  
There is no suprasellar mass. Pituitary gland is normal in volume without 
evidence of partial into sella turcica. There is no evidence of superficial or deep venous sinus thrombosis. There is 
mild, symmetric narrowing of the bilateral distal transverse sinuses. Impression IMPRESSION: 
1. No significant intraorbital abnormality. No optic nerve signal abnormality 
or enhancement. No suprasellar mass. No partial empty sella turcica. 2. No evidence of venous sinus thrombosis, though there are mild symmetric 
stenoses in the distal transverse sinuses. MRI BRAIN W WO CONT Narrative INDICATION:  Other disorders of optic nerve, not elsewhere classified, bilateral 
 
 
COMPARISON:  None TECHNIQUE:  MR imaging of the brain was performed with particular attention to 
the orbits including sagittal T1, axial T1, T2, FLAIR, GRE, DWI/ADC; coronal T2 
with fat saturation;  multiplanar pre and post T1of the whole brain and orbits 
with and without fat saturation utilizing 20 mL gadolinium. Multiplanar 2-D 
time-of-flight MRV of the brain was performed with reconstructions. FINDINGS:   
 
The ventricles are midline without hydrocephalus. There is no acute intra or 
extra-axial fluid collection. There is no significant white matter disease. There is no acute infarction. The major intracranial vascular flow-voids are 
patent. The optic nerves, extraocular muscles, and intraorbital fat are normal.  
There is no abnormal intraorbital enhancement. The optic chiasm is normal.  
There is no suprasellar mass. Pituitary gland is normal in volume without 
evidence of partial into sella turcica. There is no evidence of superficial or deep venous sinus thrombosis. There is 
mild, symmetric narrowing of the bilateral distal transverse sinuses.  
   
 
 Impression IMPRESSION: 
 1.  No significant intraorbital abnormality. No optic nerve signal abnormality 
or enhancement. No suprasellar mass. No partial empty sella turcica. 2. No evidence of venous sinus thrombosis, though there are mild symmetric 
stenoses in the distal transverse sinuses. PET Results (maximum last 3): No results found for this or any previous visit. Assessment and Plan Diagnoses and all orders for this visit: 
 
1. Optic disc edema 
-     XR SPINAL PUNC LUMB DX; Future -     CELL COUNT, CSF; Future 
-     PROTEIN, CSF; Future -     GLUCOSE, CSF 
-     CULTURE, BODY FLUID W GRAM STAIN 
 
2. Chronic migraine w/o aura w/o status migrainosus, not intractable 17-year-old gentleman who has bilateral optic disc edema. Need to obtain a lumbar puncture with opening pressure to confirm if this is increased intracranial hypertension. Depending on those results we may start acetazolamide. He is not overtly symptomatic aside from his ophthalmologic Findings. I would like to see him after the spinal tap is done. We had a long discussion about the possible diagnosis and the complications if it were remain untreated. I will see him after the procedure is done. I reviewed and decided to continue the current medications. Thank you for giving me the opportunity to assist in the care of Mr. Pro Marley. If you have questions, please do not hesitate to contact me. Sincerely, [address] [facility], DO Neurologist 
Diplomate ABPN

## 2018-04-03 ENCOUNTER — HOSPITAL ENCOUNTER (OUTPATIENT)
Dept: GENERAL RADIOLOGY | Age: 28
Discharge: HOME | End: 2018-04-03
Attending: PSYCHIATRY & NEUROLOGY
Payer: COMMERCIAL

## 2018-04-03 VITALS
OXYGEN SATURATION: 98 % | SYSTOLIC BLOOD PRESSURE: 139 MMHG | RESPIRATION RATE: 16 BRPM | HEART RATE: 59 BPM | TEMPERATURE: 98 F | DIASTOLIC BLOOD PRESSURE: 69 MMHG

## 2018-04-03 DIAGNOSIS — H47.10 OPTIC DISC EDEMA: ICD-10-CM

## 2018-04-03 LAB
APPEARANCE CSF: CLEAR
COLOR CSF: COLORLESS
GLUCOSE CSF-MCNC: 59 MG/DL (ref 40–70)
PROT CSF-MCNC: 124 MG/DL (ref 15–45)
RBC # CSF: >100 /CU MM
TUBE # CSF: 1
TUBE # CSF: 1
TUBE # CSF: 3
WBC # CSF: 2 /CU MM (ref 0–5)

## 2018-04-03 PROCEDURE — 77030014143 XR SPINAL PUNC LUMB DX

## 2018-04-03 PROCEDURE — 82945 GLUCOSE OTHER FLUID: CPT | Performed by: PSYCHIATRY & NEUROLOGY

## 2018-04-03 PROCEDURE — 84157 ASSAY OF PROTEIN OTHER: CPT | Performed by: PSYCHIATRY & NEUROLOGY

## 2018-04-03 PROCEDURE — 89050 BODY FLUID CELL COUNT: CPT | Performed by: PSYCHIATRY & NEUROLOGY

## 2018-04-03 PROCEDURE — 74011000250 HC RX REV CODE- 250: Performed by: RADIOLOGY

## 2018-04-03 PROCEDURE — 87205 SMEAR GRAM STAIN: CPT | Performed by: PSYCHIATRY & NEUROLOGY

## 2018-04-03 RX ORDER — LIDOCAINE HYDROCHLORIDE 10 MG/ML
10 INJECTION INFILTRATION; PERINEURAL
Status: COMPLETED | OUTPATIENT
Start: 2018-04-03 | End: 2018-04-03

## 2018-04-03 RX ADMIN — LIDOCAINE HYDROCHLORIDE 10 ML: 10 INJECTION, SOLUTION INFILTRATION; PERINEURAL at 09:34

## 2018-04-03 RX ADMIN — SODIUM BICARBONATE 5 ML: 0.2 INJECTION, SOLUTION INTRAVENOUS at 09:33

## 2018-04-03 NOTE — IP AVS SNAPSHOT
[address] [facility] [address] [place] [address] 
[phone] Patient: Jyotsna Vasquez MRN: [id number] :1990 About your hospitalization You were admitted on:  April 3, 2018 You last [facility] the:  [facility]  You were discharged on:  April 3, 2018 Why you were hospitalized Your primary diagnosis was:  Not on File Follow-up Information None Your Scheduled Appointments   9:00 AM EDT NUTRITION COUNSELING with Davina Mcgraw RD [address] ([address] [place] [address]) [address] [place] [address]  [address] [place] [address]  
[phone] Discharge Orders None A check ruperto indicates which time of day the medication should be taken. My Medications ASK your doctor about these medications Instructions Each Dose to Equal  
 Morning Noon Evening Bedtime  
 cephALEXin 500 mg capsule Commonly known as:  Kandy Salvage Your last dose was: Your next dose is: Take 500 mg by mouth four (4) times daily. 500 mg  
    
   
   
   
  
 clonazePAM 2 mg tablet Commonly known as:  Lucbraxton Rodgersark Your last dose was: Your next dose is: Take  by mouth two (2) times a day. cloNIDine HCl 0.1 mg tablet Commonly known as:  CATAPRES Your last dose was: Your next dose is: Take  by mouth two (2) times a day.  
     
   
   
   
  
 guanFACINE IR 1 mg IR tablet Commonly known as:  Cruzito Fabby Your last dose was: Your next dose is: Take  by mouth daily. minocycline 100 mg tablet Commonly known as:  Sherry Phillips Your last dose was: Your next dose is: Take 100 mg by mouth two (2) times a day. 100 mg  
    
   
   
   
  
 propranolol 60 mg tablet Commonly known as:  INDERAL  
   
 Your last dose was: Your next dose is: Take 60 mg by mouth two (2) times a day. 60 mg  
    
   
   
   
  
 raNITIdine 300 mg tablet Commonly known as:  ZANTAC Your last dose was: Your next dose is: Take 300 mg by mouth daily. 300 mg Discharge Instructions POST LUMBAR PUNCTURE DISCHARGE INSTRUCTIONS General Information: 
 
Lumbar Puncture: A LP is done to help diagnose several disorders, like pseudo tumor, migraines, meningitis, and multiple sclerosis. It involves a puncture (usually in the lower spine) into the sac that protects the spinal column. A sample of the fluid in that space is removed and tested in the lab. Call If: 
   You should call your Physician and/or the Radiology Nurse if you develop a headache that is not relieved by Tylenol, and worsens when you stand and eases when you lie down. You may have developed what is referred to as a spinal headache. Our physician's will probably advise you to be on strict bed rest for 24 hours, to drink lots of fluids and caffeine. If this does not help the head pain, call again the next day. You should call if you have bleeding other than a small spot on your bandage. You should call if you have any numbness, tingling, weakness, fever, chills, urinary retention, severe itching, rash, welts, swelling, or confusion. Rest today. Flat in bed. You may have a pillow. You may be up briefly for meals and up to the bathroom. Try to drink at least 6, 8 ounces of fluid today. Follow-Up Instructions: See the doctor who ordered your procedure as he/she has instructed. If you had a Lumbar Puncture, your results should be available to your ordering doctor in 3-5 business days. You can remove your dressing in 24 hours and shower regularly. Do not bathe or swim for 72 hours.   
 
  
 
To Reach Us:   
Should you experience any significant changes, please call [phone] between the hours of 7:30 am and 10 pm or [phone] after hours. After hours, ask the  to page the [address] Technologist, and describe the problem to the technologist.   
 
 
Patient Signature: 
Date: 4/3/2018 Discharging Nurse: Ru Schofield, RN Introducing Forrest Nobles As a [facility] patient, I wanted to make you aware of our electronic visit tool called Forrest Nobles. Bra[employer] allows you to connect within minutes with a medical provider 24 hours a day, seven days a week via a mobile device or tablet or logging into a secure website from your computer. You can access Forrest Nobles from anywhere in the [place]. A virtual visit might be right for you when you have a simple condition and feel like you just dont want to get out of bed, or cant get away from work for an appointment, when your regular [facility] provider is not available (evenings, weekends or holidays), or when youre out of town and need minor care. Electronic visits cost only $49 and if the Brainscape provider determines a prescription is needed to treat your condition, one can be electronically transmitted to a nearby pharmacy*. Please take a moment to enroll today if you have not already done so. The enrollment process is free and takes just a few minutes. To enroll, please download the Brainscape carlene to your tablet or phone, or visit [email or web address] to enroll on your computer. And, as an [address] [place] [address] patient with a [employer] account, the results of your visits will be scanned into your electronic medical record and your primary care provider will be able to view the scanned results. We urge you to continue to see your regular [facility] provider for your ongoing medical care.   And while your primary care provider may not be the one available when you seek a Forrest Nobles virtual visit, the peace of mind you get from getting a real diagnosis real time can be priceless. For more information on Forrest Nobles, view our Frequently Asked Questions (FAQs) at [email or web address]. Sincerely, 
 
Dianah Sicard, MD 
Chief Medical Officer [address]ess *:  certain medications cannot be prescribed via Forrest Nobles Unresulted Labs-Please follow up with your PCP about these lab tests Order Current Status CELL COUNT, CSF In process CULTURE, CSF W GRAM STAIN In process GLUCOSE, CSF In process PROTEIN, CSF In process XR SPINAL PUNC LUMB DX In process Providers Seen During Your Hospitalization Provider Specialty Primary office phone [address] [facility],  Neurology [phone] Your Primary Care Physician (PCP) Primary Care Physician Office Phone Office Fax OTHER, PHYS ** None ** ** None ** You are allergic to the following No active allergies Recent Documentation Smoking Status Former Smoker Emergency Contacts Name Discharge Info Relation Home Work Mobile Ashley Washington N/A  AT THIS TIME [6] Other Relative [6] [phone] Patient Belongings The following personal items are in your possession at time of discharge: 
                             
 
  
  
 Please provide this summary of care documentation to your next provider. Signatures-by signing, you are acknowledging that this After Visit Summary has been reviewed with you and you have received a copy. Patient Signature:  ____________________________________________________________ Date:  ____________________________________________________________  
  
Fran Dang Provider Signature:  ____________________________________________________________ Date:  ____________________________________________________________

## 2018-04-03 NOTE — DISCHARGE INSTRUCTIONS
POST LUMBAR PUNCTURE DISCHARGE INSTRUCTIONS    General Information:    Lumbar Puncture: A LP is done to help diagnose several disorders, like pseudo tumor, migraines, meningitis, and multiple sclerosis. It involves a puncture (usually in the lower spine) into the sac that protects the spinal column. A sample of the fluid in that space is removed and tested in the lab. Call If:     You should call your Physician and/or the Radiology Nurse if you develop a headache that is not relieved by Tylenol, and worsens when you stand and eases when you lie down. You may have developed what is referred to as a spinal headache. Our physician's will probably advise you to be on strict bed rest for 24 hours, to drink lots of fluids and caffeine. If this does not help the head pain, call again the next day. You should call if you have bleeding other than a small spot on your bandage. You should call if you have any numbness, tingling, weakness, fever, chills, urinary retention, severe itching, rash, welts, swelling, or confusion. Rest today. Flat in bed. You may have a pillow. You may be up briefly for meals and up to the bathroom. Try to drink at least 6, 8 ounces of fluid today. Follow-Up Instructions: See the doctor who ordered your procedure as he/she has instructed. If you had a Lumbar Puncture, your results should be available to your ordering doctor in 3-5 business days. You can remove your dressing in 24 hours and shower regularly. Do not bathe or swim for 72 hours. To Reach Us:    Should you experience any significant changes, please call [phone] between the hours of 7:30 am and 10 pm or [phone] after hours.  After hours, ask the  to page the [facility] Techn[facility], and describe the problem to the technologist.        Patient Signature:  Date: 4/3/2018  Discharging Nurse: Lisa Locke RN

## 2018-04-03 NOTE — PROGRESS NOTES
Pt discharged via wheelchair, escorted by girlfriend.  Discharge instructions given and both verbalize understanding

## 2018-04-03 NOTE — IP AVS SNAPSHOT
Summary of Care Report The Summary of Care report has been created to help improve care coordination. Users with access to [employer] or [address] (Web-based application) may access additional patient information including the Discharge Summary. If you are not currently a [address] user and need more information, please call the number listed below in the [place] [address] section and ask to be connected with Medical Records. Facility Information Name Address Phone [address] [phone] Patient Information Patient Name Sex CODY Hernandez ([id number]) Male 1990 Discharge Information Admitting Provider Service Area Unit  
 (none) [facility] Radiology / [phone] Discharge Provider Discharge Date/Time Discharge Disposition Destination (none) (none) (none) (none) Patient Language Language ENGLISH [13] Hospital Problems as of 4/3/2018  Reviewed: 3/21/2018  9:46 AM by [address] [place] [address], DO None Non-Hospital Problems as of 4/3/2018  Reviewed: 3/21/2018  9:46 AM by [address] [place] [address], DO Class Noted - Resolved Last Modified Active Problems Obesity, morbid ([facility] [address]  2018 - Present 2018 by Lillian Moraes MD  
  Entered by Lillian Moraes MD  
  
You are allergic to the following No active allergies Current Discharge Medication List  
  
ASK your doctor about these medications Dose & Instructions Dispensing Information Comments  
 cephALEXin 500 mg capsule Commonly known as:  Earmon Gravely Dose:  500 mg Take 500 mg by mouth four (4) times daily. Refills:  0  
   
 clonazePAM 2 mg tablet Commonly known as:  Milton Primer Take  by mouth two (2) times a day. Refills:  0  
   
 cloNIDine HCl 0.1 mg tablet Commonly known as:  CATAPRES  
 Take  by mouth two (2) times a day. Refills:  0  
   
 guanFACINE IR 1 mg IR tablet Commonly known as:  Lisa Small Take  by mouth daily. Refills:  0  
   
 minocycline 100 mg tablet Commonly known as:  Rebecca Bread Dose:  100 mg Take 100 mg by mouth two (2) times a day. Refills:  0  
   
 propranolol 60 mg tablet Commonly known as:  INDERAL Dose:  60 mg Take 60 mg by mouth two (2) times a day. Refills:  0  
   
 raNITIdine 300 mg tablet Commonly known as:  ZANTAC Dose:  300 mg Take 300 mg by mouth daily. Refills:  0 Follow-up Information None Discharge Instructions POST LUMBAR PUNCTURE DISCHARGE INSTRUCTIONS General Information: 
 
Lumbar Puncture: A LP is done to help diagnose several disorders, like pseudo tumor, migraines, meningitis, and multiple sclerosis. It involves a puncture (usually in the lower spine) into the sac that protects the spinal column. A sample of the fluid in that space is removed and tested in the lab. Call If: 
   You should call your Physician and/or the Radiology Nurse if you develop a headache that is not relieved by Tylenol, and worsens when you stand and eases when you lie down. You may have developed what is referred to as a spinal headache. Our physician's will probably advise you to be on strict bed rest for 24 hours, to drink lots of fluids and caffeine. If this does not help the head pain, call again the next day. You should call if you have bleeding other than a small spot on your bandage. You should call if you have any numbness, tingling, weakness, fever, chills, urinary retention, severe itching, rash, welts, swelling, or confusion. Rest today. Flat in bed. You may have a pillow. You may be up briefly for meals and up to the bathroom. Try to drink at least 6, 8 ounces of fluid today.  
 
Follow-Up Instructions: See the doctor who ordered your procedure as he/she has instructed. If you had a Lumbar Puncture, your results should be available to your ordering doctor in 3-5 business days. You can remove your dressing in 24 hours and shower regularly. Do not bathe or swim for 72 hours. To Reach Us:   
Should you experience any significant changes, please call [phone] between the hours of 7:30 am and 10 pm or [phone] after hours. After hours, ask the  to page the [address] Technologist, and describe the problem to the technologist.   
 
 
Patient Signature: 
Date: 4/3/2018 Discharging Nurse: Miley Brown RN Chart Review Routing History No Routing History on File

## 2018-04-03 NOTE — PROCEDURES
PROCEDURE:LP under flouro with opening pressures. INDICATION:optic disc edema. ANESTHESIA:local.  COMPLICATION:NONE. SPECIMENS REMOVED:4 tubes CSF. BLOOD LOSS:NONE. /ASSISTANT:MAE Sylvester RECOMMENDATIONS:BR. CONSENT OBTAINED:YES.  NOTES:opening pressure=20.

## 2018-04-10 LAB
BACTERIA SPEC CULT: NORMAL
BACTERIA SPEC CULT: NORMAL
GRAM STN SPEC: NORMAL
GRAM STN SPEC: NORMAL
SERVICE CMNT-IMP: NORMAL

## 2018-04-13 ENCOUNTER — TELEPHONE (OUTPATIENT)
Dept: NEUROLOGY | Age: 28
End: 2018-04-13

## 2018-04-13 NOTE — TELEPHONE ENCOUNTER
Dr. Jenn Sanderson office calling in re to pt.  She would like a call back from Dr. Eusebia Bethea [phone]

## 2018-04-19 ENCOUNTER — CLINICAL SUPPORT (OUTPATIENT)
Dept: SURGERY | Age: 28
End: 2018-04-19

## 2018-04-19 VITALS — WEIGHT: 315 LBS | BODY MASS INDEX: 47.5 KG/M2

## 2018-04-19 DIAGNOSIS — E66.01 MORBID OBESITY WITH BMI OF 45.0-49.9, ADULT (HCC): Primary | ICD-10-CM

## 2018-04-19 NOTE — PROGRESS NOTES
[address] [facility] Surgery at [facility]  Supervised Weight Loss     Date:   2018    Patient's Name: Fabian Simental  : 1990    Insurance:  [employer]          Session:   Surgery: Gastric Bypass  Surgeon:  Veronica Lerma M.D. Height: 6' 3\"   Weight:    380      Lbs. BMI: 47   Pounds Lost since last month: 2#               Pounds Gained since last month: 0    Starting Weight: 382#   Previous Months Weight: 382#  Overall Pounds Lost: 2#  Overall Pounds Gained: 0    Other Pertinent Information: none    Smoking Status:  vaping  Alcohol Intake: \"a few drinks every few months\"     I have reviewed with patient the guidelines of the supervised weight loss class. Patient understands the expectations of some weight loss during the weight loss trial.  Patient understands that weight gain could delay the process. I have also expressed to patient that classes need to be consecutive. Missing a class may subject patient to have to start their trial over. Patient has received this information in writing. Changes that patient has made since last month include:  Stop eating when full, prepping more at home instead of eating out. Eating Habits and Behaviors  A review of the general nutrition guidelines in preparation for weight loss surgery was provided. Patients were instructed that their plate should be made up 1/2 plate coming from non-starchy vegetables, 1/4 coming from lean meat, and 1/4 of their plate coming from carbohydrates, including fruits, starches, or milk. We discussed measuring meals to 1/2 cup total per meal after surgery. Emphasis was placed on the importance of eating 3 meals a day and aiming for 60 grams of protein per day. I educated the patient on limiting liquid calories and drinking only calorie-free, sugar-free and non-carbonated beverages. We discussed the importance of drinking 64 ounces of fluid per day to prevent dehydration post-operatively. Patient's current diet habits include: eating 2-3 meals per day. Snacking on fruit. Eating chips, bread, pasta, rice and cereal daily. Eating cookies and ice cream a few times per month. Eating baked, grilled, broiled and air fried foods. Eating out is 1-3 times per week. Drinking 20 oz water, 40-60 oz soda. Denies emotional or situational eating. Sometimes packing meals when away from home. Eating most meals at a table and takes 10-15 minutes to finish the meal. Reports food choices and time are biggest barriers to weight loss. Physical Activity/Exercise  A lifestyle and behavior change discussion was provided specific to exercise. We discussed common barriers to exercise, ways to work around barriers and various ways to get started with exercise. We talked about the importance of increasing daily physical activity and beginning to develop an exercise regimen/routine. We discussed that exercise is an important part of long term weight loss after surgery. Comments:  During class, I discussed with patient the importance of getting into an exercise routine. Patient is currently not exercising d/t time and back pain. Patient has been encouraged to make time for exercise and consider short intervals of walking spaced throughout the day d/t time and back pain. Behavior Modification       Comments: We discussed the importance of eating mindfully after weight loss surgery to prevent food intolerance and prevent weight regain. We talked about how to eat more mindfully and identify emotional eating triggers. Tips and recommendations for how to make these changes were provided. Patient was encouraged to keep a food journal and record what they were taking in daily. Overall Assessment: Patient demonstrates appropriate lifestyle changes in preparation for weight loss surgery evidenced by reported changes and weight loss.  Will continue to assess as pt works to complete supervised weight loss requirements. Patient-Set Goals:   1. Nutrition - prep more meals at home, cut down on soda  2. Exercise - start going back to the gym, get an exercise partner   3.  Behavior -stop eating when full, healthier ordering when eating out     Chuyita Choi, RD  4/19/2018

## 2018-05-21 ENCOUNTER — CLINICAL SUPPORT (OUTPATIENT)
Dept: SURGERY | Age: 28
End: 2018-05-21

## 2018-05-21 VITALS — WEIGHT: 315 LBS | BODY MASS INDEX: 48 KG/M2

## 2018-05-21 DIAGNOSIS — E66.01 MORBID OBESITY WITH BMI OF 45.0-49.9, ADULT (HCC): Primary | ICD-10-CM

## 2018-05-21 NOTE — PROGRESS NOTES
[address] [facility] Surgery at [facility]  Supervised Weight Loss     Date:   2018    Patient's Name: Crista Kumar  : 1990    Insurance:  [employer]          Session: 3 of  6  Surgery: Gastric Bypass  Surgeon:  Justin Hinojosa M.D. Height: 6' 3\"   Weight:    384      Lbs. BMI: 48   Pounds Lost since last month: 0               Pounds Gained since last month: 4#    Starting Weight: 382#   Previous Months Weight: 380#  Overall Pounds Lost: 0  Overall Pounds Gained: 2#    Other Pertinent Information: n/a     Smoking Status:  vaping  Alcohol Intake: 3-4 drinks, once a month     I have reviewed with patient the guidelines of the supervised weight loss class. Patient understands the expectations of some weight loss during the weight loss trial.  Patient understands that weight gain could delay the process. I have also expressed to patient that classes need to be consecutive. Missing a class may subject patient to have to start their trial over. Patient has received this information in writing. Changes that patient has made since last month include:  Healthier choices when eating out, more water, healthier snack choices. Eating Habits and Behaviors  A review of the general nutrition guidelines in preparation for weight loss surgery was provided. A nutrition less was presented regarding label reading with specific emphasis on limiting added sugars to help promote weight loss and prevent dumping syndrome. Patients were instructed that their plate should be made up 1/2 plate coming from non-starchy vegetables, 1/4 coming from lean meat, and 1/4 of their plate coming from carbohydrates, including fruits, starches, or milk. We discussed measuring meals to 1/2 cup total per meal after surgery. Emphasis was placed on the importance of eating 3 meals a day and aiming for 60 grams of protein per day.  I educated the patient on limiting liquid calories and drinking only calorie-free, sugar-free and non-carbonated beverages. We discussed the importance of drinking 64 ounces of fluid per day to prevent dehydration post-operatively. Patient's current diet habits include: eating 2-3 meals per day. Snacking on nuts and fruit. Eating crackers, bread, pasta and rice \"often\". Eating cookies and ice cream 1-2 times per week. Eating a mixture of baked, grilled, broiled and some fried foods. Eating out is 1-3 times per week. Drinking 20-30 oz water, 20 oz soda, 20 oz Crystal Light daily. Denies emotional or situational eating. Sometimes packing meals when away from home. Eating most meals at a table or while watching television and takes 10-15 minutes to finish the meal. Reports overeating, food choices, portion sizes, late night eating and lack of activity are all current barriers to weight loss. Physical Activity/Exercise  We talked about the importance of increasing daily physical activity and beginning to develop an exercise regimen/routine. We discussed that exercise is an important part of long term weight loss after surgery. Comments:  During class, I discussed with patient the importance of getting into an exercise routine. Patient is currently not exercising stating \"lack of time\" for activity. Patient has been encouraged to maintain and increase as tolerated. Behavior Modification       Comments: We discussed the importance of eating mindfully after weight loss surgery to prevent food intolerance and prevent weight regain. We talked about how to eat more mindfully and identify emotional eating triggers. Tips and recommendations for how to make these changes were provided. Patient was encouraged to keep a food journal and record what they were taking in daily. Overall Assessment: Patient demonstrates some small changes evidenced mostly by reported changes.  Continued changes are indicated evidenced by answers to diet/lifestlye questionnaire and weight gain. Will continue to assess. Patient-Set Goals:   1. Nutrition - eat healthier   2. Exercise - go back to the gym   3.  Behavior -limit eating out    Sara Dennis, RD  5/21/2018

## 2018-05-29 ENCOUNTER — TELEPHONE (OUTPATIENT)
Dept: NEUROLOGY | Age: 28
End: 2018-05-29

## 2018-05-29 NOTE — TELEPHONE ENCOUNTER
Dr. Nolasco Mom called stating she would like a call back from Dr Naveen Levine regarding patient Raquel Hatch.

## 2018-06-18 ENCOUNTER — CLINICAL SUPPORT (OUTPATIENT)
Dept: SURGERY | Age: 28
End: 2018-06-18

## 2018-06-18 VITALS — WEIGHT: 315 LBS | BODY MASS INDEX: 48 KG/M2

## 2018-06-18 DIAGNOSIS — E66.01 MORBID OBESITY WITH BMI OF 45.0-49.9, ADULT (HCC): Primary | ICD-10-CM

## 2018-06-18 NOTE — PROGRESS NOTES
[facility] at [facility] [address] [place] [address]  Supervised Weight Loss     Date:   2018    Patient's Name: Yas Koroma  : 1990    Insurance:  [employer]          Session:   Surgery: Gastric Bypass  Surgeon:  Ben Jacob M.D. Height: 6' 3\"   Weight:    384      Lbs. BMI: 48   Pounds Lost since last month: 0               Pounds Gained since last month: 0    Starting Weight: 382#   Previous Months Weight: 384#  Overall Pounds Lost: 0  Overall Pounds Gained: 2#    Other Pertinent Information: Patient with back pain the past few days d/t slipped disc in back. Smoking Status:  Smokes cigars   Alcohol Intake: 1 drink, 2-3 times per month     I have reviewed with patient the guidelines of the supervised weight loss class. Patient understands the expectations of some weight loss during the weight loss trial.  Patient understands that weight gain could delay the process. I have also expressed to patient that classes need to be consecutive. Missing a class may subject patient to have to start their trial over. Patient has received this information in writing. Changes that patient has made since last month include:  Reduced intake of sodas, more grilled foods when dining out, more cooking at home. Eating Habits and Behaviors  A review of the general nutrition guidelines in preparation for weight loss surgery was provided. A nutrition less was presented specific to protein intake including food sources of protein, protein supplements and protein shakes. We discussed the importance of getting 60-80 grams of protein after surger. Patients were instructed that their plate should be made up 1/2 plate coming from non-starchy vegetables, 1/4 coming from lean meat, and 1/4 of their plate coming from carbohydrates, including fruits, starches, or milk. We discussed measuring meals to 1/2 cup total per meal after surgery.  Emphasis was placed on the importance of eating 3 meals a day and aiming for 60 grams of protein per day. I educated the patient on limiting liquid calories and drinking only calorie-free, sugar-free and non-carbonated beverages. We discussed the importance of drinking 64 ounces of fluid per day to prevent dehydration post-operatively. Patient's current diet habits include: eating 2-3 meals per day. Tends to skip meals on the weekends d/t sleeping in later. Snacking on fruit, peanut butter, nuts. Eating chips, crackers, bread, pasta and rice \"a few times per week\". Eating ice cream once a week \"if that\". Drinking mostly water. Ordering sweetened tea when dining out which is 1-3 times per week. We discussed replacements to sweetened tea to lower sugar intake and prevent dumping syndrome after gastric bypass. Denies emotional or situational eating. Reports late night eating is biggest challenge to weight loss at this time. Physical Activity/Exercise  We talked about the importance of increasing daily physical activity and beginning to develop an exercise regimen/routine. We discussed that exercise is an important part of long term weight loss after surgery. Comments:  During class, I discussed with patient the importance of getting into an exercise routine. Patient is currently not exercising d/t back pain. Patient has been encouraged to resume exercise when able to help promote weight loss. Behavior Modification       Comments: We discussed the importance of eating mindfully after weight loss surgery to prevent food intolerance and prevent weight regain. We talked about how to eat more mindfully and identify emotional eating triggers. Tips and recommendations for how to make these changes were provided. Patient was encouraged to keep a food journal and record what they were taking in daily. Overall Assessment: Patient demonstrates small appropriate lifestyle changes in preparation for weight loss surgery.  Continued changes are indicated. Goals set and recommendations made. Will continue to assess. Patient-Set Goals:   1. Nutrition - eat protein at each meal, limit sweetened tea when dining out, try a protein shake for tolerance, eat 3 meals a day even on the weekends    2. Exercise - resume when able   3.  Behavior -eat protein first at the meal     Nick Richards, [address]  6/18/2018

## 2018-07-19 ENCOUNTER — CLINICAL SUPPORT (OUTPATIENT)
Dept: SURGERY | Age: 28
End: 2018-07-19

## 2018-07-19 VITALS — WEIGHT: 315 LBS | BODY MASS INDEX: 46.87 KG/M2

## 2018-07-19 DIAGNOSIS — E66.01 MORBID OBESITY WITH BMI OF 45.0-49.9, ADULT (HCC): Primary | ICD-10-CM

## 2018-07-19 NOTE — PROGRESS NOTES
[address] [facility] Surgery at [facility]  Supervised Weight Loss     Date:   2018    Patient's Name: Melody Dupont  : 1990    Insurance:  [employer]          Session:   Surgery: Gastric Bypass   Surgeon:  Meek Shaver M.D. Height: 6' 3\"   Weight:    375      Lbs. BMI: 46   Pounds Lost since last month: 9#               Pounds Gained since last month: 0    Starting Weight: 382#   Previous Months Weight: 384#  Overall Pounds Lost: 7#  Overall Pounds Gained: 0    Other Pertinent Information: n/a     Smoking Status:  cigars  Alcohol Intake: 1 drink, once a month     I have reviewed with patient the guidelines of the supervised weight loss class. Patient understands the expectations of some weight loss during the weight loss trial.  Patient understands that weight gain could delay the process. I have also expressed to patient that classes need to be consecutive. Missing a class may subject patient to have to start their trial over. Patient has received this information in writing. Changes that patient has made since last month include:  Reduced sodas even more, drinking Crystal Light, cut out sweets. Eating Habits and Behaviors  A review of the general nutrition guidelines in preparation for weight loss surgery was provided. A nutrition less was presented specific to vitamins. We discussed current vitamin recommendations and the importance of life-long adherence to a vitamin/mineral regimen after wt loss surgery. Patients were instructed that their plate should be made up 1/2 plate coming from non-starchy vegetables, 1/4 coming from lean meat, and 1/4 of their plate coming from carbohydrates, including fruits, starches, or milk. We discussed measuring meals to 1/2 cup total per meal after surgery. Emphasis was placed on the importance of eating 3 meals a day and aiming for 60 grams of protein per day.  I educated the patient on limiting liquid calories and drinking only calorie-free, sugar-free and non-carbonated beverages. We discussed the importance of drinking 64 ounces of fluid per day to prevent dehydration post-operatively. Patient's current diet habits include: eating 2-3 meals per day. Snacking on salsa, yogurt, baked chips. Eating chips, bread, pasta, rice a few times per week. Eating ice cream once a month. Eating a mixture of baked, grilled, broiled and some fried foods. Eating out is 1-3 times per week but does not ordered fried foods. Drinking 40 ounces of Crystal Light daily. Denies emotional or situational eating. Sometimes packing meals when away from home. Eating most meals at a table and takes 10-15 minutes to finish the meal. Reports lack of activity is biggest barrier to weight loss at this time. Physical Activity/Exercise  We talked about the importance of increasing daily physical activity and beginning to develop an exercise regimen/routine. We discussed that exercise is an important part of long term weight loss after surgery. Comments:  During class, I discussed with patient the importance of getting into an exercise routine. Patient is currently not exercising d/t \"back pain\" that limits activity. Patient has been encouraged to consider seated/chair exercises, water based exercise or short intervals of activity spaced throughout the day. Behavior Modification       Comments: We discussed the importance of eating mindfully after weight loss surgery to prevent food intolerance and prevent weight regain. We talked about how to eat more mindfully and identify emotional eating triggers. Tips and recommendations for how to make these changes were provided. Patient was encouraged to keep a food journal and record what they were taking in daily. Overall Assessment: Patient demonstrates appropriate lifestyle changes in preparation for weight loss surgery evidenced by reported changes and weight loss. Will continue to assess as pt works to complete supervised weight loss requirements. Patient-Set Goals:   1. Nutrition - limit sugar and calorie intake   2. Exercise - go back to the gym   3.  Behavior -research food if eating out     Omid Bowden, DIETER  7/19/2018

## 2022-03-19 PROBLEM — E66.01 OBESITY, MORBID (HCC): Status: ACTIVE | Noted: 2018-02-05

## 2023-08-24 NOTE — MR AVS SNAPSHOT
[address] [place] [address] 
[phone] Patient: Lizzy Gasca MRN: [id number] :1990 Visit Information Date & Time Provider Department Dept. Phone Encounter #  
 3/21/2018 10:00 AM [address] [place], [place] [address]DO [facility] at [address] [place] [address] Road [id number] Follow-up Instructions Return in about 3 months (around 2018), or and after spinal tap. Your Appointments 3/21/2018 10:00 AM  
New Patient with [address] [place], [place] [address]DO [facility] at [facility] Appt Note: n/p bilateral disc edema 3/9/2018 [facility] [address]  
[address]  
  
    
 2018  9:00 AM  
NUTRITION COUNSELING with Yael Silver RD [address] [facility] ([facility]) Appt Note: supervised wt loss #2 [address] [facility]  [address] [facility] [address]  
[phone]  
  
   
 [address] [facility] [address] [facility] [facility] [address] Upcoming Health Maintenance Date Due DTaP/Tdap/Td series (1 - Tdap) 10/10/2009 Influenza Age 5 to Adult 2017 Allergies as of 3/21/2018  Review Complete On: 3/21/2018 By: Pete Castano LPN No Known Allergies Current Immunizations  Never Reviewed No immunizations on file. Not reviewed this visit You Were Diagnosed With   
  
 Codes Comments Optic disc edema    -  Primary ICD-10-CM: H47.10 ICD-9-CM: 377.00 Chronic migraine w/o aura w/o status migrainosus, not intractable     ICD-10-CM: X53.816 ICD-9-CM: 346.70 Vitals BP Pulse Resp Weight(growth percentile) SpO2 BMI  
 156/90 80 18 (!) 382 lb (173.3 kg) 98% 47.75 kg/m2 Smoking Status Former Smoker BMI and BSA Data Body Mass Index Body Surface Area 47.75 kg/m 2 3.03 m 2 Your Updated Medication List  
  
   
This list is accurate as of 3/21/18  9:46 AM.  Always use your most recent med list.  
  
  
  
  
 cephALEXin 500 mg capsule Commonly known as:  Ronda Short Take 500 mg by mouth four (4) times daily. clonazePAM 2 mg tablet Commonly known as:  Merilee Blind Take  by mouth two (2) times a day. cloNIDine HCl 0.1 mg tablet Commonly known as:  CATAPRES Take  by mouth two (2) times a day.  
  
 guanFACINE IR 1 mg IR tablet Commonly known as:  Rahul Monk Take  by mouth daily. minocycline 100 mg tablet Commonly known as:  Corlis Neil Take 100 mg by mouth two (2) times a day. propranolol 60 mg tablet Commonly known as:  INDERAL Take 60 mg by mouth two (2) times a day. raNITIdine 300 mg tablet Commonly known as:  ZANTAC Take 300 mg by mouth daily. We Performed the Following CULTURE, BODY FLUID Zara Sanders STAIN [61981 CPT(R)] GLUCOSE, CSF [id number] CPT(R)] Follow-up Instructions Return in about 3 months (around 6/21/2018), or and after spinal tap. To-Do List   
 03/21/2018 Lab:  CELL COUNT, CSF   
  
 03/21/2018 Lab:  PROTEIN, CSF   
  
 03/21/2018 Imaging:  XR SPINAL PUNC LUMB DX Patient Instructions A Healthy Lifestyle: Care Instructions Your Care Instructions A healthy lifestyle can help you feel good, stay at a healthy weight, and have plenty of energy for both work and play. A healthy lifestyle is something you can share with your whole family. A healthy lifestyle also can lower your risk for serious health problems, such as high blood pressure, heart disease, and diabetes. You can follow a few steps listed below to improve your health and the health of your family. Follow-up care is a key part of your treatment and safety.  Be sure to make and go to all appointments, and call your doctor if you are having problems. It's also a good idea to know your test results and keep a list of the medicines you take. How can you care for yourself at home? · Do not eat too much sugar, fat, or fast foods. You can still have dessert and treats now and then. The goal is moderation. · Start small to improve your eating habits. Pay attention to portion sizes, drink less juice and soda pop, and eat more fruits and vegetables. ¨ Eat a healthy amount of food. A 3-ounce serving of meat, for example, is about the size of a deck of cards. Fill the rest of your plate with vegetables and whole grains. ¨ Limit the amount of soda and sports drinks you have every day. Drink more water when you are thirsty. ¨ Eat at least 5 servings of fruits and vegetables every day. It may seem like a lot, but it is not hard to reach this goal. A serving or helping is 1 piece of fruit, 1 cup of vegetables, or 2 cups of leafy, raw vegetables. Have an apple or some carrot sticks as an afternoon snack instead of a candy bar. Try to have fruits and/or vegetables at every meal. 
· Make exercise part of your daily routine. You may want to start with simple activities, such as walking, bicycling, or slow swimming. Try to be active 30 to 60 minutes every day. You do not need to do all 30 to 60 minutes all at once. For example, you can exercise 3 times a day for 10 or 20 minutes. Moderate exercise is safe for most people, but it is always a good idea to talk to your doctor before starting an exercise program. 
· Keep moving. Cody Pinedo the lawn, work in the garden, or [employer]. Take the stairs instead of the elevator at work. · If you smoke, quit. People who smoke have an increased risk for heart attack, stroke, cancer, and other lung illnesses. Quitting is hard, but there are ways to boost your chance of quitting tobacco for good. ¨ Use nicotine gum, patches, or lozenges. ¨ Ask your doctor about stop-smoking programs and medicines. ¨ Keep trying. In addition to reducing your risk of diseases in the future, you will notice some benefits soon after you stop using tobacco. If you have shortness of breath or asthma symptoms, they will likely get better within a few weeks after you quit. · Limit how much alcohol you drink. Moderate amounts of alcohol (up to 2 drinks a day for men, 1 drink a day for women) are okay. But drinking too much can lead to liver problems, high blood pressure, and other health problems. Family health If you have a family, there are many things you can do together to improve your health. · Eat meals together as a family as often as possible. · Eat healthy foods. This includes fruits, vegetables, lean meats and dairy, and whole grains. · Include your family in your fitness plan. Most people think of activities such as jogging or tennis as the way to fitness, but there are many ways you and your family can be more active. Anything that makes you breathe hard and gets your heart pumping is exercise. Here are some tips: 
¨ Walk to do errands or to take your child to school or the bus. ¨ Go for a family bike ride after dinner instead of watching TV. Where can you learn more? Go to http://diandra-soco.info/. Enter G532 in the search box to learn more about \"A Healthy Lifestyle: Care Instructions. \" Current as of: May 12, 2017 Content Version: 11.4 © 2018-8716 [employer]. Care instructions adapted under license by [employer] (which disclaims liability or warranty for this information). If you have questions about a medical condition or this instruction, always ask your healthcare professional. [place] [address] any warranty or liability for your use of this information. Lumbar Puncture: After Your Visit Your Care Instructions A lumbar puncture (also called a spinal tap) is a test to check the fluid that surrounds and protects your spinal cord and brain.  Your doctor may have done this test to look for an infection. In some cases, a lumbar puncture is done to release pressure from too much fluid or to look for diseases such as multiple sclerosis. The fluid that was taken is often sent to a lab for different tests. Your doctor may get some answers right away, but other answers take hours to days. Your doctor will call you with the results. You may feel tired or have a mild backache or a headache after the test. Some people have trouble sleeping for 1 or 2 days. Follow-up care is a key part of your treatment and safety. Be sure to make and go to all appointments, and call your doctor if you are having problems. Its also a good idea to know your test results and keep a list of the medicines you take. How can you care for yourself at home? · Drink plenty of liquids in the next few hours. This may prevent a headache or keep a headache from being severe. · Your doctor may tell you to lie flat in bed for 1 to 4 hours. This may prevent a headache. · Get plenty of rest. 
· If your doctor prescribed antibiotics, take them as directed. Do not stop taking them just because you feel better. You need to take the full course of antibiotics. · Take anti-inflammatory medicines to reduce a headache or backache. These include ibuprofen (Advil, Motrin) and naproxen (Aleve). Read and follow all instructions on the label. When should you call for help? Call your doctor now or seek immediate medical care if: 
· You have a fever with a stiff neck or a severe headache. · You have any drainage or bleeding from the site of the puncture. · You feel numb or lose strength below the puncture site. Watch closely for changes in your health, and be sure to contact your doctor if: 
· You do not get better as expected. Where can you learn more? Go to [employer].be Enter [id number] in the search box to learn more about \"Lumbar Puncture: After Your Visit. \"  
 © 5792-7730 [employer]. Care instructions adapted under license by [facility] (which disclaims liability or warranty for this information). This care instruction is for use with your licensed healthcare professional. If you have questions about a medical condition or this instruction, always ask your healthcare professional. [address] any warranty or liability for your use of this information. Content Version: 4.6.097825; Last Revised: September 13, 2011 Please provide this summary of care documentation to your next provider. Your primary care clinician is listed as Phys Other. If you have any questions after today's visit, please call [phone]. [NS_AttendInformed_OBGYN_ALL_OB:[id number]] [NS_AttendInformed_OBGYN_ALL_OB:[id number]] [NS_AttendInformed_OBGYN_ALL_OB:[id number]]

## 2024-05-18 ENCOUNTER — HOSPITAL ENCOUNTER (EMERGENCY)
Facility: HOSPITAL | Age: 34
Discharge: HOME | End: 2024-05-18
Attending: EMERGENCY MEDICINE
Payer: COMMERCIAL

## 2024-05-18 ENCOUNTER — APPOINTMENT (OUTPATIENT)
Facility: HOSPITAL | Age: 34
End: 2024-05-18
Payer: COMMERCIAL

## 2024-05-18 VITALS
OXYGEN SATURATION: 99 % | SYSTOLIC BLOOD PRESSURE: 145 MMHG | DIASTOLIC BLOOD PRESSURE: 92 MMHG | TEMPERATURE: 98.1 F | WEIGHT: 307.54 LBS | HEART RATE: 100 BPM | HEIGHT: 75 IN | BODY MASS INDEX: 38.24 KG/M2 | RESPIRATION RATE: 20 BRPM

## 2024-05-18 DIAGNOSIS — R11.2 NAUSEA AND VOMITING, UNSPECIFIED VOMITING TYPE: ICD-10-CM

## 2024-05-18 DIAGNOSIS — R19.7 DIARRHEA OF PRESUMED INFECTIOUS ORIGIN: ICD-10-CM

## 2024-05-18 DIAGNOSIS — R07.89 ATYPICAL CHEST PAIN: Primary | ICD-10-CM

## 2024-05-18 LAB
ALBUMIN SERPL-MCNC: 3.8 G/DL (ref 3.5–5)
ALBUMIN/GLOB SERPL: 0.7 (ref 1.1–2.2)
ALP SERPL-CCNC: 71 U/L (ref 45–117)
ALT SERPL-CCNC: 27 U/L (ref 12–78)
ANION GAP SERPL CALC-SCNC: 6 MMOL/L (ref 5–15)
AST SERPL-CCNC: 16 U/L (ref 15–37)
BASOPHILS # BLD: 0.1 K/UL (ref 0–0.1)
BASOPHILS NFR BLD: 1 % (ref 0–1)
BILIRUB SERPL-MCNC: 0.5 MG/DL (ref 0.2–1)
BUN SERPL-MCNC: 14 MG/DL (ref 6–20)
BUN/CREAT SERPL: 11 (ref 12–20)
CALCIUM SERPL-MCNC: 9.4 MG/DL (ref 8.5–10.1)
CHLORIDE SERPL-SCNC: 109 MMOL/L (ref 97–108)
CO2 SERPL-SCNC: 23 MMOL/L (ref 21–32)
COMMENT:: NORMAL
CREAT SERPL-MCNC: 1.31 MG/DL (ref 0.7–1.3)
DIFFERENTIAL METHOD BLD: ABNORMAL
EOSINOPHIL # BLD: 0.1 K/UL (ref 0–0.4)
EOSINOPHIL NFR BLD: 1 % (ref 0–7)
ERYTHROCYTE [DISTWIDTH] IN BLOOD BY AUTOMATED COUNT: 13.8 % (ref 11.5–14.5)
GLOBULIN SER CALC-MCNC: 5.4 G/DL (ref 2–4)
GLUCOSE SERPL-MCNC: 88 MG/DL (ref 65–100)
HCT VFR BLD AUTO: 49.5 % (ref 36.6–50.3)
HGB BLD-MCNC: 16.9 G/DL (ref 12.1–17)
IMM GRANULOCYTES # BLD AUTO: 0 K/UL
IMM GRANULOCYTES NFR BLD AUTO: 0 %
LIPASE SERPL-CCNC: 31 U/L (ref 13–75)
LYMPHOCYTES # BLD: 2.6 K/UL (ref 0.8–3.5)
LYMPHOCYTES NFR BLD: 28 % (ref 12–49)
MAGNESIUM SERPL-MCNC: 2.1 MG/DL (ref 1.6–2.4)
MCH RBC QN AUTO: 29.1 PG (ref 26–34)
MCHC RBC AUTO-ENTMCNC: 34.1 G/DL (ref 30–36.5)
MCV RBC AUTO: 85.3 FL (ref 80–99)
MONOCYTES # BLD: 0.4 K/UL (ref 0–1)
MONOCYTES NFR BLD: 4 % (ref 5–13)
NEUTS SEG # BLD: 6 K/UL (ref 1.8–8)
NEUTS SEG NFR BLD: 66 % (ref 32–75)
NRBC # BLD: 0 K/UL (ref 0–0.01)
NRBC BLD-RTO: 0 PER 100 WBC
PLATELET # BLD AUTO: 205 K/UL (ref 150–400)
PLATELET COMMENT: ABNORMAL
PMV BLD AUTO: 11.5 FL (ref 8.9–12.9)
POTASSIUM SERPL-SCNC: 3.8 MMOL/L (ref 3.5–5.1)
PROT SERPL-MCNC: 9.2 G/DL (ref 6.4–8.2)
RBC # BLD AUTO: 5.8 M/UL (ref 4.1–5.7)
RBC MORPH BLD: ABNORMAL
SODIUM SERPL-SCNC: 138 MMOL/L (ref 136–145)
SPECIMEN HOLD: NORMAL
TROPONIN I SERPL HS-MCNC: 4 NG/L (ref 0–76)
WBC # BLD AUTO: 9.2 K/UL (ref 4.1–11.1)

## 2024-05-18 PROCEDURE — 71046 X-RAY EXAM CHEST 2 VIEWS: CPT

## 2024-05-18 PROCEDURE — 80053 COMPREHEN METABOLIC PANEL: CPT

## 2024-05-18 PROCEDURE — 2580000003 HC RX 258: Performed by: EMERGENCY MEDICINE

## 2024-05-18 PROCEDURE — 83735 ASSAY OF MAGNESIUM: CPT

## 2024-05-18 PROCEDURE — 83690 ASSAY OF LIPASE: CPT

## 2024-05-18 PROCEDURE — 93005 ELECTROCARDIOGRAM TRACING: CPT | Performed by: EMERGENCY MEDICINE

## 2024-05-18 PROCEDURE — 85025 COMPLETE CBC W/AUTO DIFF WBC: CPT

## 2024-05-18 PROCEDURE — 99285 EMERGENCY DEPT VISIT HI MDM: CPT

## 2024-05-18 PROCEDURE — 6360000002 HC RX W HCPCS: Performed by: EMERGENCY MEDICINE

## 2024-05-18 PROCEDURE — 96374 THER/PROPH/DIAG INJ IV PUSH: CPT

## 2024-05-18 PROCEDURE — 6370000000 HC RX 637 (ALT 250 FOR IP): Performed by: EMERGENCY MEDICINE

## 2024-05-18 PROCEDURE — 36415 COLL VENOUS BLD VENIPUNCTURE: CPT

## 2024-05-18 PROCEDURE — 84484 ASSAY OF TROPONIN QUANT: CPT

## 2024-05-18 RX ORDER — ONDANSETRON 4 MG/1
4 TABLET, ORALLY DISINTEGRATING ORAL EVERY 8 HOURS PRN
Qty: 20 TABLET | Refills: 0 | Status: SHIPPED | OUTPATIENT
Start: 2024-05-18

## 2024-05-18 RX ORDER — ONDANSETRON 2 MG/ML
4 INJECTION INTRAMUSCULAR; INTRAVENOUS ONCE
Status: COMPLETED | OUTPATIENT
Start: 2024-05-18 | End: 2024-05-18

## 2024-05-18 RX ORDER — 0.9 % SODIUM CHLORIDE 0.9 %
2000 INTRAVENOUS SOLUTION INTRAVENOUS ONCE
Status: COMPLETED | OUTPATIENT
Start: 2024-05-18 | End: 2024-05-18

## 2024-05-18 RX ORDER — 0.9 % SODIUM CHLORIDE 0.9 %
1000 INTRAVENOUS SOLUTION INTRAVENOUS ONCE
Status: DISCONTINUED | OUTPATIENT
Start: 2024-05-18 | End: 2024-05-18

## 2024-05-18 RX ADMIN — SODIUM CHLORIDE 2000 ML: 9 INJECTION, SOLUTION INTRAVENOUS at 19:07

## 2024-05-18 RX ADMIN — ALUMINUM HYDROXIDE, MAGNESIUM HYDROXIDE, AND SIMETHICONE 40 ML: 1200; 120; 1200 SUSPENSION ORAL at 19:13

## 2024-05-18 RX ADMIN — ONDANSETRON 4 MG: 2 INJECTION INTRAMUSCULAR; INTRAVENOUS at 19:11

## 2024-05-18 ASSESSMENT — ENCOUNTER SYMPTOMS
DIARRHEA: 1
NAUSEA: 1
VOMITING: 1

## 2024-05-18 ASSESSMENT — PAIN DESCRIPTION - LOCATION: LOCATION: ABDOMEN;CHEST

## 2024-05-18 ASSESSMENT — PAIN DESCRIPTION - ONSET: ONSET: ON-GOING

## 2024-05-18 ASSESSMENT — PAIN - FUNCTIONAL ASSESSMENT
PAIN_FUNCTIONAL_ASSESSMENT: ACTIVITIES ARE NOT PREVENTED
PAIN_FUNCTIONAL_ASSESSMENT: 0-10

## 2024-05-18 ASSESSMENT — PAIN DESCRIPTION - PAIN TYPE: TYPE: ACUTE PAIN

## 2024-05-18 ASSESSMENT — PAIN DESCRIPTION - DESCRIPTORS: DESCRIPTORS: SHOOTING;SHARP

## 2024-05-18 ASSESSMENT — PAIN DESCRIPTION - FREQUENCY: FREQUENCY: CONTINUOUS

## 2024-05-18 ASSESSMENT — PAIN SCALES - GENERAL: PAINLEVEL_OUTOF10: 2

## 2024-05-18 NOTE — ED PROVIDER NOTES
[facility] EMERGENCY DEP  EMERGENCY DEPARTMENT ENCOUNTER      Pt Name: Joanne De La Fuente  MRN: [id number]  Birthdate 1990  Date of evaluation: 5/18/2024  Provider: Amy Hensley MD    CHIEF COMPLAINT       Chief Complaint   Patient presents with    Abdominal Pain    Chest Pain         HISTORY OF PRESENT ILLNESS    Joanne De La Fuente is a 34 yo M with chest pain after vomiting.  He states 2 nights ago he developed abdominal pain, vomiting and diarrhea after eating a grilled chicken sandwich at [facility].  He continues to vomiting and diarrhea today and now has burning pain in his chest that shoots through to his back.  He denies fevers.  He states he has not been able to keep anything down           Additional history from independent historians:     Review of External Medical Records:     Nursing Notes were reviewed.    REVIEW OF SYSTEMS       Review of Systems   Cardiovascular:  Positive for chest pain.   Gastrointestinal:  Positive for diarrhea, nausea and vomiting.       Except as noted above the remainder of the review of systems was reviewed and negative.       PAST MEDICAL HISTORY   No past medical history on file.      SURGICAL HISTORY     No past surgical history on file.      CURRENT MEDICATIONS       Previous Medications    No medications on file       ALLERGIES     Patient has no known allergies.    FAMILY HISTORY     No family history on file.       SOCIAL HISTORY       Social History     Socioeconomic History    Marital status: Single         PHYSICAL EXAM       ED Triage Vitals [05/18/24 1814]   BP Temp Temp Source Pulse Respirations SpO2 Height Weight - Scale   (!) 145/92 98.1 °F (36.7 °C) Oral 100 20 99 % 1.905 m (6' 3\") (!) 139.5 kg (307 lb 8.7 oz)       Body mass index is 38.44 kg/m².    Physical Exam  Vitals and nursing note reviewed.   Constitutional:       General: He is not in acute distress.  HENT:      Head: Normocephalic and atraumatic.      Mouth/Throat:      Mouth: Mucous membranes are  moist.   Eyes:      Extraocular Movements: Extraocular movements intact.      Conjunctiva/sclera: Conjunctivae normal.   Cardiovascular:      Rate and Rhythm: Normal rate.   Pulmonary:      Effort: Pulmonary effort is normal. No respiratory distress.   Abdominal:      General: There is no distension.      Tenderness: There is no abdominal tenderness. There is no guarding or rebound.   Musculoskeletal:         General: No deformity.      Cervical back: Normal range of motion.   Skin:     General: Skin is warm and dry.   Neurological:      General: No focal deficit present.      Mental Status: He is alert.         DIAGNOSTIC RESULTS     EKG: All EKG's are interpreted by the Emergency Department Physician listed in the interpretation in the absence of a cardiologist and may also be found below under Reassessment/ED Course.    ED EKG Interpretation:  Time: 6:21 PM  Rhythm: normal sinus rhythm; and regular . Rate (approx.): 93; Axis: normal; P wave: normal; QRS interval: normal ; ST/T wave: non-specific changes; Other findings: abnormal.  EKG interpreted by Amy Hensley MD.        RADIOLOGY:   Non-plain film images such as CT, Ultrasound and MRI are read by the radiologist. Plain radiographic images are visualized and preliminarily interpreted by the emergency physician with the below findings:    Interpretation per the Radiologist below, if available at the time of this note:    XR CHEST (2 VW)   Final Result   No acute intrathoracic process.              LABS:  Labs Reviewed   CBC WITH AUTO DIFFERENTIAL - Abnormal; Notable for the following components:       Result Value    RBC 5.80 (*)     Monocytes % 4 (*)     All other components within normal limits   COMPREHENSIVE METABOLIC PANEL - Abnormal; Notable for the following components:    Chloride 109 (*)     Creatinine 1.31 (*)     BUN/Creatinine Ratio 11 (*)     Total Protein 9.2 (*)     Globulin 5.4 (*)     Albumin/Globulin Ratio 0.7 (*)     All other components

## 2024-05-19 LAB
EKG ATRIAL RATE: 93 BPM
EKG DIAGNOSIS: NORMAL
EKG P AXIS: 46 DEGREES
EKG P-R INTERVAL: 164 MS
EKG Q-T INTERVAL: 326 MS
EKG QRS DURATION: 84 MS
EKG QTC CALCULATION (BAZETT): 405 MS
EKG R AXIS: 25 DEGREES
EKG T AXIS: -16 DEGREES
EKG VENTRICULAR RATE: 93 BPM